# Patient Record
Sex: FEMALE | Race: BLACK OR AFRICAN AMERICAN | Employment: OTHER | URBAN - METROPOLITAN AREA
[De-identification: names, ages, dates, MRNs, and addresses within clinical notes are randomized per-mention and may not be internally consistent; named-entity substitution may affect disease eponyms.]

---

## 2024-05-01 ENCOUNTER — HOSPITAL ENCOUNTER (INPATIENT)
Age: 89
LOS: 9 days | Discharge: HOME | End: 2024-05-10
Attending: STUDENT IN AN ORGANIZED HEALTH CARE EDUCATION/TRAINING PROGRAM
Payer: MEDICARE

## 2024-05-01 ENCOUNTER — APPOINTMENT (OUTPATIENT)
Dept: CT IMAGING | Age: 89
End: 2024-05-01
Payer: MEDICARE

## 2024-05-01 DIAGNOSIS — G82.20 PARAPLEGIA (HCC): ICD-10-CM

## 2024-05-01 DIAGNOSIS — L89.153 DECUBITUS ULCER OF SACRAL REGION, STAGE 3 (HCC): ICD-10-CM

## 2024-05-01 DIAGNOSIS — R00.0 TACHYCARDIA: ICD-10-CM

## 2024-05-01 DIAGNOSIS — D72.829 LEUKOCYTOSIS, UNSPECIFIED TYPE: Primary | ICD-10-CM

## 2024-05-01 DIAGNOSIS — R79.89 ELEVATED BRAIN NATRIURETIC PEPTIDE (BNP) LEVEL: ICD-10-CM

## 2024-05-01 PROBLEM — A41.9 SEPSIS (HCC): Status: ACTIVE | Noted: 2024-05-01

## 2024-05-01 LAB
ALBUMIN SERPL-MCNC: 2.1 G/DL (ref 3.2–4.6)
ALBUMIN/GLOB SERPL: 0.5 (ref 1–1.9)
ALP SERPL-CCNC: 171 U/L (ref 35–104)
ALT SERPL-CCNC: 6 U/L (ref 12–65)
ANION GAP SERPL CALC-SCNC: 9 MMOL/L (ref 9–18)
APPEARANCE UR: CLEAR
AST SERPL-CCNC: 30 U/L (ref 15–37)
BACTERIA URNS QL MICRO: 0 /HPF
BASOPHILS # BLD: 0.1 K/UL (ref 0–0.2)
BASOPHILS NFR BLD: 0 % (ref 0–2)
BILIRUB SERPL-MCNC: 0.5 MG/DL (ref 0–1.2)
BILIRUB UR QL: NEGATIVE
BUN SERPL-MCNC: 34 MG/DL (ref 8–23)
CALCIUM SERPL-MCNC: 11.5 MG/DL (ref 8.8–10.2)
CASTS URNS QL MICRO: NORMAL /LPF
CHLORIDE SERPL-SCNC: 104 MMOL/L (ref 98–107)
CO2 SERPL-SCNC: 24 MMOL/L (ref 20–28)
COLOR UR: NORMAL
CREAT SERPL-MCNC: 1.05 MG/DL (ref 0.6–1.1)
CRYSTALS URNS QL MICRO: 0 /LPF
DIFFERENTIAL METHOD BLD: ABNORMAL
EOSINOPHIL # BLD: 0 K/UL (ref 0–0.8)
EOSINOPHIL NFR BLD: 0 % (ref 0.5–7.8)
EPI CELLS #/AREA URNS HPF: NORMAL /HPF
ERYTHROCYTE [DISTWIDTH] IN BLOOD BY AUTOMATED COUNT: 16.6 % (ref 11.9–14.6)
GLOBULIN SER CALC-MCNC: 4.2 G/DL (ref 2.3–3.5)
GLUCOSE SERPL-MCNC: 154 MG/DL (ref 70–99)
GLUCOSE UR STRIP.AUTO-MCNC: NEGATIVE MG/DL
HCT VFR BLD AUTO: 24.8 % (ref 35.8–46.3)
HGB BLD-MCNC: 7.9 G/DL (ref 11.7–15.4)
HGB UR QL STRIP: NEGATIVE
IMM GRANULOCYTES # BLD AUTO: 0.2 K/UL (ref 0–0.5)
IMM GRANULOCYTES NFR BLD AUTO: 1 % (ref 0–5)
KETONES UR QL STRIP.AUTO: NEGATIVE MG/DL
LACTATE SERPL-SCNC: 1.7 MMOL/L (ref 0.5–2)
LACTATE SERPL-SCNC: 2.1 MMOL/L (ref 0.5–2)
LEUKOCYTE ESTERASE UR QL STRIP.AUTO: NEGATIVE
LIPASE SERPL-CCNC: 15 U/L (ref 13–60)
LYMPHOCYTES # BLD: 2.2 K/UL (ref 0.5–4.6)
LYMPHOCYTES NFR BLD: 15 % (ref 13–44)
MCH RBC QN AUTO: 27.4 PG (ref 26.1–32.9)
MCHC RBC AUTO-ENTMCNC: 31.9 G/DL (ref 31.4–35)
MCV RBC AUTO: 86.1 FL (ref 82–102)
MONOCYTES # BLD: 1.6 K/UL (ref 0.1–1.3)
MONOCYTES NFR BLD: 11 % (ref 4–12)
MUCOUS THREADS URNS QL MICRO: 0 /LPF
NEUTS SEG # BLD: 11 K/UL (ref 1.7–8.2)
NEUTS SEG NFR BLD: 73 % (ref 43–78)
NITRITE UR QL STRIP.AUTO: NEGATIVE
NRBC # BLD: 0 K/UL (ref 0–0.2)
NT PRO BNP: 1002 PG/ML (ref 0–450)
OTHER OBSERVATIONS: NORMAL
PH UR STRIP: 5.5 (ref 5–9)
PLATELET # BLD AUTO: 401 K/UL (ref 150–450)
PMV BLD AUTO: 9.4 FL (ref 9.4–12.3)
POTASSIUM SERPL-SCNC: 4.9 MMOL/L (ref 3.5–5.1)
PROCALCITONIN SERPL-MCNC: 0.57 NG/ML (ref 0–0.1)
PROT SERPL-MCNC: 6.3 G/DL (ref 6.3–8.2)
PROT UR STRIP-MCNC: NEGATIVE MG/DL
RBC # BLD AUTO: 2.88 M/UL (ref 4.05–5.2)
RBC #/AREA URNS HPF: NORMAL /HPF
SODIUM SERPL-SCNC: 136 MMOL/L (ref 136–145)
SP GR UR REFRACTOMETRY: 1.01 (ref 1–1.02)
URINE CULTURE IF INDICATED: NORMAL
UROBILINOGEN UR QL STRIP.AUTO: 0.2 EU/DL (ref 0.2–1)
WBC # BLD AUTO: 15 K/UL (ref 4.3–11.1)
WBC URNS QL MICRO: NORMAL /HPF

## 2024-05-01 PROCEDURE — 87154 CUL TYP ID BLD PTHGN 6+ TRGT: CPT

## 2024-05-01 PROCEDURE — 99285 EMERGENCY DEPT VISIT HI MDM: CPT

## 2024-05-01 PROCEDURE — 87040 BLOOD CULTURE FOR BACTERIA: CPT

## 2024-05-01 PROCEDURE — 81001 URINALYSIS AUTO W/SCOPE: CPT

## 2024-05-01 PROCEDURE — 36415 COLL VENOUS BLD VENIPUNCTURE: CPT

## 2024-05-01 PROCEDURE — 80053 COMPREHEN METABOLIC PANEL: CPT

## 2024-05-01 PROCEDURE — 93005 ELECTROCARDIOGRAM TRACING: CPT | Performed by: EMERGENCY MEDICINE

## 2024-05-01 PROCEDURE — 96375 TX/PRO/DX INJ NEW DRUG ADDON: CPT

## 2024-05-01 PROCEDURE — 6360000002 HC RX W HCPCS: Performed by: STUDENT IN AN ORGANIZED HEALTH CARE EDUCATION/TRAINING PROGRAM

## 2024-05-01 PROCEDURE — 84145 PROCALCITONIN (PCT): CPT

## 2024-05-01 PROCEDURE — 87205 SMEAR GRAM STAIN: CPT

## 2024-05-01 PROCEDURE — 96374 THER/PROPH/DIAG INJ IV PUSH: CPT

## 2024-05-01 PROCEDURE — 85025 COMPLETE CBC W/AUTO DIFF WBC: CPT

## 2024-05-01 PROCEDURE — 2580000003 HC RX 258: Performed by: STUDENT IN AN ORGANIZED HEALTH CARE EDUCATION/TRAINING PROGRAM

## 2024-05-01 PROCEDURE — 6360000004 HC RX CONTRAST MEDICATION: Performed by: STUDENT IN AN ORGANIZED HEALTH CARE EDUCATION/TRAINING PROGRAM

## 2024-05-01 PROCEDURE — 74177 CT ABD & PELVIS W/CONTRAST: CPT

## 2024-05-01 PROCEDURE — 87186 SC STD MICRODIL/AGAR DIL: CPT

## 2024-05-01 PROCEDURE — 83880 ASSAY OF NATRIURETIC PEPTIDE: CPT

## 2024-05-01 PROCEDURE — 83605 ASSAY OF LACTIC ACID: CPT

## 2024-05-01 PROCEDURE — 6370000000 HC RX 637 (ALT 250 FOR IP): Performed by: STUDENT IN AN ORGANIZED HEALTH CARE EDUCATION/TRAINING PROGRAM

## 2024-05-01 PROCEDURE — 1100000000 HC RM PRIVATE

## 2024-05-01 PROCEDURE — 83690 ASSAY OF LIPASE: CPT

## 2024-05-01 PROCEDURE — 87077 CULTURE AEROBIC IDENTIFY: CPT

## 2024-05-01 RX ORDER — GINSENG 100 MG
1 CAPSULE ORAL DAILY
Status: ON HOLD | COMMUNITY
End: 2024-05-10 | Stop reason: HOSPADM

## 2024-05-01 RX ORDER — SODIUM CHLORIDE 0.9 % (FLUSH) 0.9 %
5-40 SYRINGE (ML) INJECTION PRN
Status: DISCONTINUED | OUTPATIENT
Start: 2024-05-01 | End: 2024-05-10 | Stop reason: HOSPADM

## 2024-05-01 RX ORDER — ACETAMINOPHEN 325 MG/1
650 TABLET ORAL EVERY 6 HOURS PRN
Status: DISCONTINUED | OUTPATIENT
Start: 2024-05-01 | End: 2024-05-10 | Stop reason: HOSPADM

## 2024-05-01 RX ORDER — POLYETHYLENE GLYCOL 3350 17 G/17G
17 POWDER, FOR SOLUTION ORAL DAILY PRN
Status: DISCONTINUED | OUTPATIENT
Start: 2024-05-01 | End: 2024-05-10 | Stop reason: HOSPADM

## 2024-05-01 RX ORDER — SODIUM CHLORIDE 0.9 % (FLUSH) 0.9 %
5-40 SYRINGE (ML) INJECTION EVERY 12 HOURS SCHEDULED
Status: DISCONTINUED | OUTPATIENT
Start: 2024-05-01 | End: 2024-05-10 | Stop reason: HOSPADM

## 2024-05-01 RX ORDER — ISOSORBIDE DINITRATE 20 MG/1
20 TABLET ORAL 3 TIMES DAILY
Status: ON HOLD | COMMUNITY
End: 2024-05-10 | Stop reason: HOSPADM

## 2024-05-01 RX ORDER — ENOXAPARIN SODIUM 100 MG/ML
40 INJECTION SUBCUTANEOUS DAILY
Status: DISCONTINUED | OUTPATIENT
Start: 2024-05-02 | End: 2024-05-07

## 2024-05-01 RX ORDER — ACETAMINOPHEN 325 MG/1
325 TABLET ORAL EVERY 6 HOURS PRN
COMMUNITY

## 2024-05-01 RX ORDER — SODIUM CHLORIDE 9 MG/ML
INJECTION, SOLUTION INTRAVENOUS PRN
Status: DISCONTINUED | OUTPATIENT
Start: 2024-05-01 | End: 2024-05-10 | Stop reason: HOSPADM

## 2024-05-01 RX ORDER — LEVOTHYROXINE SODIUM 0.05 MG/1
50 TABLET ORAL DAILY
COMMUNITY

## 2024-05-01 RX ORDER — ONDANSETRON 2 MG/ML
4 INJECTION INTRAMUSCULAR; INTRAVENOUS EVERY 6 HOURS PRN
Status: DISCONTINUED | OUTPATIENT
Start: 2024-05-01 | End: 2024-05-10 | Stop reason: HOSPADM

## 2024-05-01 RX ORDER — FUROSEMIDE 10 MG/ML
20 INJECTION INTRAMUSCULAR; INTRAVENOUS ONCE
Status: COMPLETED | OUTPATIENT
Start: 2024-05-01 | End: 2024-05-01

## 2024-05-01 RX ORDER — LOSARTAN POTASSIUM 50 MG/1
50 TABLET ORAL 2 TIMES DAILY
COMMUNITY

## 2024-05-01 RX ORDER — ONDANSETRON 4 MG/1
4 TABLET, ORALLY DISINTEGRATING ORAL EVERY 8 HOURS PRN
Status: DISCONTINUED | OUTPATIENT
Start: 2024-05-01 | End: 2024-05-10 | Stop reason: HOSPADM

## 2024-05-01 RX ORDER — OMEPRAZOLE 20 MG/1
40 CAPSULE, DELAYED RELEASE ORAL DAILY
COMMUNITY

## 2024-05-01 RX ORDER — ACETAMINOPHEN 650 MG/1
650 SUPPOSITORY RECTAL EVERY 6 HOURS PRN
Status: DISCONTINUED | OUTPATIENT
Start: 2024-05-01 | End: 2024-05-10 | Stop reason: HOSPADM

## 2024-05-01 RX ORDER — ONDANSETRON 4 MG/1
4 TABLET, ORALLY DISINTEGRATING ORAL EVERY 6 HOURS PRN
Status: ON HOLD | COMMUNITY
End: 2024-05-10 | Stop reason: HOSPADM

## 2024-05-01 RX ORDER — METOPROLOL SUCCINATE 50 MG/1
50 TABLET, EXTENDED RELEASE ORAL DAILY
COMMUNITY

## 2024-05-01 RX ORDER — AMLODIPINE BESYLATE 5 MG/1
5 TABLET ORAL DAILY
Status: ON HOLD | COMMUNITY
End: 2024-05-10 | Stop reason: HOSPADM

## 2024-05-01 RX ADMIN — IOPAMIDOL 100 ML: 755 INJECTION, SOLUTION INTRAVENOUS at 13:50

## 2024-05-01 RX ADMIN — ACETAMINOPHEN 650 MG: 325 TABLET ORAL at 22:12

## 2024-05-01 RX ADMIN — SODIUM CHLORIDE, PRESERVATIVE FREE 10 ML: 5 INJECTION INTRAVENOUS at 22:08

## 2024-05-01 RX ADMIN — VANCOMYCIN HYDROCHLORIDE 750 MG: 750 INJECTION, POWDER, LYOPHILIZED, FOR SOLUTION INTRAVENOUS at 22:08

## 2024-05-01 RX ADMIN — SODIUM CHLORIDE: 9 INJECTION, SOLUTION INTRAVENOUS at 22:05

## 2024-05-01 RX ADMIN — FUROSEMIDE 20 MG: 10 INJECTION, SOLUTION INTRAMUSCULAR; INTRAVENOUS at 17:24

## 2024-05-01 RX ADMIN — WATER 1000 MG: 1 INJECTION INTRAMUSCULAR; INTRAVENOUS; SUBCUTANEOUS at 16:32

## 2024-05-01 ASSESSMENT — PAIN SCALES - GENERAL
PAINLEVEL_OUTOF10: 3
PAINLEVEL_OUTOF10: 5
PAINLEVEL_OUTOF10: 0
PAINLEVEL_OUTOF10: 0

## 2024-05-01 ASSESSMENT — PAIN DESCRIPTION - DESCRIPTORS: DESCRIPTORS: ACHING;CRAMPING

## 2024-05-01 ASSESSMENT — LIFESTYLE VARIABLES
HOW OFTEN DO YOU HAVE A DRINK CONTAINING ALCOHOL: NEVER
HOW MANY STANDARD DRINKS CONTAINING ALCOHOL DO YOU HAVE ON A TYPICAL DAY: PATIENT DOES NOT DRINK

## 2024-05-01 ASSESSMENT — PAIN DESCRIPTION - ORIENTATION: ORIENTATION: LEFT

## 2024-05-01 ASSESSMENT — PAIN - FUNCTIONAL ASSESSMENT
PAIN_FUNCTIONAL_ASSESSMENT: PREVENTS OR INTERFERES SOME ACTIVE ACTIVITIES AND ADLS
PAIN_FUNCTIONAL_ASSESSMENT: 0-10

## 2024-05-01 ASSESSMENT — PAIN DESCRIPTION - PAIN TYPE: TYPE: ACUTE PAIN

## 2024-05-01 ASSESSMENT — PAIN DESCRIPTION - LOCATION
LOCATION: ARM;BREAST
LOCATION: ABDOMEN;BACK

## 2024-05-01 NOTE — ED PROVIDER NOTES
Emergency Department Provider Note       PCP: Unknown, Provider, APRN - NP   Age: 89 y.o.   Sex: female     DISPOSITION Admitted 05/01/2024 06:42:51 PM       ICD-10-CM    1. Leukocytosis, unspecified type  D72.829       2. Elevated brain natriuretic peptide (BNP) level  R79.89       3. Tachycardia  R00.0           Medical Decision Making     89-year-old female presents to Emergency department with family.  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  Patient reports fullness in her abdomen.  Family reports she takes Lasix intermittently given by the staff at the facility when she needs it for fluid retention.  He is no longer doing chemotherapy as it apparently affected patient's heart function.  Is on blood thinners.  Has nausea but no vomiting.  Patient is not aware when she has bowel movements or urinates but then reports staff did not have any concern for patient being constipated.  Apparently patient has a large sacral wound which they are also concerned about.  Will obtain basic lab work.  Lab work shows white count 15, hemoglobin 7.9, BNP elevated at 1000, patient was given Lasix.  CMP does show significantly elevated BUN to creatinine ratio.  Pro-Chris was elevated at 0.57 of unclear etiology.  Blood cultures and lactic were obtained lactic is normal.  CT scan shows no emergent findings.  Given patient's white count, elevated Pro-Chris, as well as elevated BNP, patient would benefit from medical admission.  Hospitalist consulted.  Patient and family voiced understanding and agreement.     1 acute illness with systemic symptoms.      I independently ordered and reviewed each unique test.     The patients assessment required an independent historian: family.  The reason they were needed is important historical information not provided by the patient.  I interpreted the CT Scan no

## 2024-05-01 NOTE — ED TRIAGE NOTES
Arrived via EMS, coming from [facility], C/o Abd swelling with pain x1 month radiating into chest started today. Denies B&B concerns. Thinks her Lasix was stopped when she went to [facility].     H/o Left breast Cancer with Mets to spine

## 2024-05-01 NOTE — PROGRESS NOTES
VANCO DAILY FOLLOW UP NOTE  [facility]   Pharmacy Pharmacokinetic Monitoring Service - Vancomycin    Consulting Provider: Dr. Lopez   Indication: SSTI  Target Concentration: Goal AUC/JAMILA 400-600 mg*hr/L  Day of Therapy: 1 of 7  Additional Antimicrobials: cefepime    Pertinent Laboratory Values:   Wt Readings from Last 1 Encounters:   05/01/24 72.6 kg (160 lb)     Temp Readings from Last 1 Encounters:   05/01/24 97.9 °F (36.6 °C) (Oral)     Recent Labs     05/01/24  1303 05/01/24  1435   BUN 34*  --    CREATININE 1.05  --    WBC 15.0*  --    PROCAL 0.57*  --    LACTSEPSIS  --  1.7     Estimated Creatinine Clearance: 36 mL/min (based on SCr of 1.05 mg/dL).    No results found for: \"VANCOTROUGH\", \"VANCORANDOM\"    MRSA Nasal Swab: N/A. Non-respiratory infection    Assessment:  Date/Time Dose Concentration AUC         Note: Serum concentrations collected for AUC dosing may appear elevated if collected in close proximity to the dose administered, this is not necessarily an indication of toxicity    Plan:  Dosing recommendations based on Bayesian software  Start vancomycin 1500 mg x 1 followed by 750 mg every 24 hours  Anticipated AUC of 441 and trough concentration of 13.3 at steady state  Renal labs as indicated   Vancomycin concentrations will be ordered as clinically appropriate   Pharmacy will continue to monitor patient and adjust therapy as indicated    Thank you for the consult,  Brittni Stout RPH

## 2024-05-01 NOTE — H&P
Hospitalist History and Physical   Admit Date:  2024 11:49 AM   Name:  Brittany Chapin   Age:  89 y.o.  Sex:  female  :  1933   MRN:  [id number]   Room:  [facility]/    Presenting/Chief Complaint: Abdominal pain  Reason(s) for Admission: Tachycardia [R00.0]  Elevated brain natriuretic peptide (BNP) level [R79.89]  Sepsis (HCC) [A41.9]  Leukocytosis, unspecified type [D72.829]     History of Present Illness:   Brittany Chapin is a 89 y.o. female with past medical history of breast cancer with metastatic disease to cervical spine who presented to the ED on 2024 with cc of abdominal pain and chest pain for the last several days.  Patient has a history of breast cancer and was noted to have spinal metastatic disease about 3 months ago resulting in paraplegia.  Also noted to have sacral wound over the last month that has continued to progress.    In the ED, labs showed creatinine 1.05, lactic acid 1.7, calcium 11.5, proBNP 1002, WBC 15, and hemoglobin 10.9.  CT showed no acute abnormalities.  She received IV ceftriaxone, IV Lasix, and was admitted for further care.    Assessment & Plan:     Sepsis due to sacral wound (POA, due to suspected gram positive bacteria)   -As evidenced by HR > 90, RR > 20  -Cefepime and vancomycin  -Consult surgery for possible debridement    Breast cancer with metastatic disease to the spine  -Analgesics as needed    Hypertension  -Metoprolol  -Received Lasix in the ED  -Resume home losartan, Imdur pending med rec    Chronic normocytic anemia  -No evidence of active bleeding  -Trend CBC    Hypothyroidism  -Synthroid    Dispo: Anticipate length stay greater than 2 midnights.  PT/OT evals and PPD needed/ordered?  No  Diet: ADULT DIET; Regular  ADULT ORAL NUTRITION SUPPLEMENT; Breakfast, Lunch, Dinner; Standard High Calorie/High Protein Oral Supplement  VTE prophylaxis: Lovenox  Code status: DNR    Non-peripheral Lines and Tubes (if present):     none      Hospital Problems:  Principal

## 2024-05-02 PROBLEM — R33.9 URINARY RETENTION: Status: ACTIVE | Noted: 2024-05-02

## 2024-05-02 LAB
ACCESSION NUMBER, LLC1M: ABNORMAL
ACINETOBACTER CALCOAC BAUMANNII COMPLEX BY PCR: NOT DETECTED
ALBUMIN SERPL-MCNC: 2 G/DL (ref 3.2–4.6)
ALBUMIN/GLOB SERPL: 0.4 (ref 1–1.9)
ALP SERPL-CCNC: 146 U/L (ref 35–104)
ALT SERPL-CCNC: 6 U/L (ref 12–65)
ANION GAP SERPL CALC-SCNC: 15 MMOL/L (ref 9–18)
AST SERPL-CCNC: 26 U/L (ref 15–37)
B FRAGILIS DNA BLD POS QL NAA+NON-PROBE: NOT DETECTED
BASOPHILS # BLD: 0 K/UL (ref 0–0.2)
BASOPHILS NFR BLD: 0 % (ref 0–2)
BILIRUB SERPL-MCNC: 0.5 MG/DL (ref 0–1.2)
BIOFIRE TEST COMMENT: ABNORMAL
BUN SERPL-MCNC: 33 MG/DL (ref 8–23)
C ALBICANS DNA BLD POS QL NAA+NON-PROBE: NOT DETECTED
C AURIS DNA BLD POS QL NAA+NON-PROBE: NOT DETECTED
C GATTII+NEOFOR DNA BLD POS QL NAA+N-PRB: NOT DETECTED
C GLABRATA DNA BLD POS QL NAA+NON-PROBE: NOT DETECTED
C KRUSEI DNA BLD POS QL NAA+NON-PROBE: NOT DETECTED
C PARAP DNA BLD POS QL NAA+NON-PROBE: NOT DETECTED
C TROPICLS DNA BLD POS QL NAA+NON-PROBE: NOT DETECTED
CALCIUM SERPL-MCNC: 11.6 MG/DL (ref 8.8–10.2)
CHLORIDE SERPL-SCNC: 101 MMOL/L (ref 98–107)
CO2 SERPL-SCNC: 22 MMOL/L (ref 20–28)
CREAT SERPL-MCNC: 1.06 MG/DL (ref 0.6–1.1)
DIFFERENTIAL METHOD BLD: ABNORMAL
E CLOAC COMP DNA BLD POS NAA+NON-PROBE: NOT DETECTED
E COLI DNA BLD POS QL NAA+NON-PROBE: NOT DETECTED
E FAECALIS DNA BLD POS QL NAA+NON-PROBE: NOT DETECTED
E FAECIUM DNA BLD POS QL NAA+NON-PROBE: NOT DETECTED
EKG ATRIAL RATE: 91 BPM
EKG DIAGNOSIS: NORMAL
EKG P AXIS: 49 DEGREES
EKG P-R INTERVAL: 176 MS
EKG Q-T INTERVAL: 364 MS
EKG QRS DURATION: 84 MS
EKG QTC CALCULATION (BAZETT): 448 MS
EKG R AXIS: 14 DEGREES
EKG T AXIS: 56 DEGREES
EKG VENTRICULAR RATE: 91 BPM
ENTEROBACTERALES DNA BLD POS NAA+N-PRB: NOT DETECTED
EOSINOPHIL # BLD: 0.1 K/UL (ref 0–0.8)
EOSINOPHIL NFR BLD: 0 % (ref 0.5–7.8)
ERYTHROCYTE [DISTWIDTH] IN BLOOD BY AUTOMATED COUNT: 16.8 % (ref 11.9–14.6)
GLOBULIN SER CALC-MCNC: 4.5 G/DL (ref 2.3–3.5)
GLUCOSE SERPL-MCNC: 110 MG/DL (ref 70–99)
GP B STREP DNA BLD POS QL NAA+NON-PROBE: NOT DETECTED
HAEM INFLU DNA BLD POS QL NAA+NON-PROBE: NOT DETECTED
HCT VFR BLD AUTO: 23.1 % (ref 35.8–46.3)
HGB BLD-MCNC: 7.1 G/DL (ref 11.7–15.4)
IMM GRANULOCYTES # BLD AUTO: 0.1 K/UL (ref 0–0.5)
IMM GRANULOCYTES NFR BLD AUTO: 1 % (ref 0–5)
K OXYTOCA DNA BLD POS QL NAA+NON-PROBE: NOT DETECTED
KLEBSIELLA SP DNA BLD POS QL NAA+NON-PRB: NOT DETECTED
KLEBSIELLA SP DNA BLD POS QL NAA+NON-PRB: NOT DETECTED
L MONOCYTOG DNA BLD POS QL NAA+NON-PROBE: NOT DETECTED
LYMPHOCYTES # BLD: 2.7 K/UL (ref 0.5–4.6)
LYMPHOCYTES NFR BLD: 16 % (ref 13–44)
MAGNESIUM SERPL-MCNC: 2 MG/DL (ref 1.8–2.4)
MCH RBC QN AUTO: 27 PG (ref 26.1–32.9)
MCHC RBC AUTO-ENTMCNC: 30.7 G/DL (ref 31.4–35)
MCV RBC AUTO: 87.8 FL (ref 82–102)
MECA+MECC ISLT/SPM QL: DETECTED
MONOCYTES # BLD: 2.4 K/UL (ref 0.1–1.3)
MONOCYTES NFR BLD: 14 % (ref 4–12)
N MEN DNA BLD POS QL NAA+NON-PROBE: NOT DETECTED
NEUTS SEG # BLD: 11.4 K/UL (ref 1.7–8.2)
NEUTS SEG NFR BLD: 69 % (ref 43–78)
NRBC # BLD: 0.02 K/UL (ref 0–0.2)
P AERUGINOSA DNA BLD POS NAA+NON-PROBE: NOT DETECTED
PLATELET # BLD AUTO: 380 K/UL (ref 150–450)
PMV BLD AUTO: 9.7 FL (ref 9.4–12.3)
POTASSIUM SERPL-SCNC: 4.4 MMOL/L (ref 3.5–5.1)
PROT SERPL-MCNC: 6.5 G/DL (ref 6.3–8.2)
PROTEUS SP DNA BLD POS QL NAA+NON-PROBE: NOT DETECTED
RBC # BLD AUTO: 2.63 M/UL (ref 4.05–5.2)
RESISTANT GENE TARGETS: ABNORMAL
S AUREUS DNA BLD POS QL NAA+NON-PROBE: NOT DETECTED
S AUREUS+CONS DNA BLD POS NAA+NON-PROBE: DETECTED
S EPIDERMIDIS DNA BLD POS QL NAA+NON-PRB: DETECTED
S LUGDUNENSIS DNA BLD POS QL NAA+NON-PRB: NOT DETECTED
S MALTOPHILIA DNA BLD POS QL NAA+NON-PRB: NOT DETECTED
S MARCESCENS DNA BLD POS NAA+NON-PROBE: NOT DETECTED
S PNEUM DNA BLD POS QL NAA+NON-PROBE: NOT DETECTED
S PYO DNA BLD POS QL NAA+NON-PROBE: NOT DETECTED
SALMONELLA DNA BLD POS QL NAA+NON-PROBE: NOT DETECTED
SODIUM SERPL-SCNC: 138 MMOL/L (ref 136–145)
STREPTOCOCCUS DNA BLD POS NAA+NON-PROBE: NOT DETECTED
WBC # BLD AUTO: 16.7 K/UL (ref 4.3–11.1)

## 2024-05-02 PROCEDURE — 36415 COLL VENOUS BLD VENIPUNCTURE: CPT

## 2024-05-02 PROCEDURE — 6360000002 HC RX W HCPCS: Performed by: STUDENT IN AN ORGANIZED HEALTH CARE EDUCATION/TRAINING PROGRAM

## 2024-05-02 PROCEDURE — 97530 THERAPEUTIC ACTIVITIES: CPT

## 2024-05-02 PROCEDURE — 2580000003 HC RX 258: Performed by: STUDENT IN AN ORGANIZED HEALTH CARE EDUCATION/TRAINING PROGRAM

## 2024-05-02 PROCEDURE — 6370000000 HC RX 637 (ALT 250 FOR IP): Performed by: STUDENT IN AN ORGANIZED HEALTH CARE EDUCATION/TRAINING PROGRAM

## 2024-05-02 PROCEDURE — 2400000000

## 2024-05-02 PROCEDURE — 97112 NEUROMUSCULAR REEDUCATION: CPT

## 2024-05-02 PROCEDURE — 97161 PT EVAL LOW COMPLEX 20 MIN: CPT

## 2024-05-02 PROCEDURE — 1100000000 HC RM PRIVATE

## 2024-05-02 PROCEDURE — 85025 COMPLETE CBC W/AUTO DIFF WBC: CPT

## 2024-05-02 PROCEDURE — 93010 ELECTROCARDIOGRAM REPORT: CPT | Performed by: INTERNAL MEDICINE

## 2024-05-02 PROCEDURE — 83735 ASSAY OF MAGNESIUM: CPT

## 2024-05-02 PROCEDURE — 80053 COMPREHEN METABOLIC PANEL: CPT

## 2024-05-02 PROCEDURE — 2500000003 HC RX 250 WO HCPCS: Performed by: STUDENT IN AN ORGANIZED HEALTH CARE EDUCATION/TRAINING PROGRAM

## 2024-05-02 PROCEDURE — 97167 OT EVAL HIGH COMPLEX 60 MIN: CPT

## 2024-05-02 RX ORDER — MORPHINE SULFATE 2 MG/ML
2 INJECTION, SOLUTION INTRAMUSCULAR; INTRAVENOUS EVERY 4 HOURS PRN
Status: DISCONTINUED | OUTPATIENT
Start: 2024-05-02 | End: 2024-05-10 | Stop reason: HOSPADM

## 2024-05-02 RX ORDER — METOPROLOL SUCCINATE 50 MG/1
50 TABLET, EXTENDED RELEASE ORAL DAILY
Status: DISCONTINUED | OUTPATIENT
Start: 2024-05-02 | End: 2024-05-10 | Stop reason: HOSPADM

## 2024-05-02 RX ORDER — SODIUM CHLORIDE, SODIUM LACTATE, POTASSIUM CHLORIDE, CALCIUM CHLORIDE 600; 310; 30; 20 MG/100ML; MG/100ML; MG/100ML; MG/100ML
INJECTION, SOLUTION INTRAVENOUS CONTINUOUS
Status: DISCONTINUED | OUTPATIENT
Start: 2024-05-02 | End: 2024-05-05

## 2024-05-02 RX ORDER — SODIUM HYPOCHLORITE 1.25 MG/ML
SOLUTION TOPICAL DAILY
Status: DISCONTINUED | OUTPATIENT
Start: 2024-05-03 | End: 2024-05-10 | Stop reason: HOSPADM

## 2024-05-02 RX ORDER — TAMSULOSIN HYDROCHLORIDE 0.4 MG/1
0.4 CAPSULE ORAL DAILY
Status: DISCONTINUED | OUTPATIENT
Start: 2024-05-02 | End: 2024-05-02

## 2024-05-02 RX ORDER — HYDROCODONE BITARTRATE AND ACETAMINOPHEN 5; 325 MG/1; MG/1
1 TABLET ORAL EVERY 6 HOURS PRN
Status: DISCONTINUED | OUTPATIENT
Start: 2024-05-02 | End: 2024-05-10 | Stop reason: HOSPADM

## 2024-05-02 RX ORDER — LEVOTHYROXINE SODIUM 0.05 MG/1
50 TABLET ORAL DAILY
Status: DISCONTINUED | OUTPATIENT
Start: 2024-05-02 | End: 2024-05-10 | Stop reason: HOSPADM

## 2024-05-02 RX ADMIN — ACETAMINOPHEN 650 MG: 325 TABLET ORAL at 05:25

## 2024-05-02 RX ADMIN — WATER 2000 MG: 1 INJECTION INTRAMUSCULAR; INTRAVENOUS; SUBCUTANEOUS at 14:00

## 2024-05-02 RX ADMIN — SODIUM CHLORIDE, PRESERVATIVE FREE 10 ML: 5 INJECTION INTRAVENOUS at 20:37

## 2024-05-02 RX ADMIN — SODIUM CHLORIDE, POTASSIUM CHLORIDE, SODIUM LACTATE AND CALCIUM CHLORIDE: 600; 310; 30; 20 INJECTION, SOLUTION INTRAVENOUS at 09:25

## 2024-05-02 RX ADMIN — ACETAMINOPHEN 650 MG: 325 TABLET ORAL at 20:35

## 2024-05-02 RX ADMIN — ONDANSETRON 4 MG: 4 TABLET, ORALLY DISINTEGRATING ORAL at 18:07

## 2024-05-02 RX ADMIN — TUBERCULIN PURIFIED PROTEIN DERIVATIVE 5 UNITS: 5 INJECTION, SOLUTION INTRADERMAL at 09:12

## 2024-05-02 RX ADMIN — SODIUM CHLORIDE, PRESERVATIVE FREE 10 ML: 5 INJECTION INTRAVENOUS at 09:14

## 2024-05-02 RX ADMIN — ACETAMINOPHEN 650 MG: 325 TABLET ORAL at 09:20

## 2024-05-02 RX ADMIN — CEFEPIME 2000 MG: 2 INJECTION, POWDER, FOR SOLUTION INTRAVENOUS at 22:44

## 2024-05-02 RX ADMIN — VANCOMYCIN HYDROCHLORIDE 1000 MG: 1 INJECTION, POWDER, LYOPHILIZED, FOR SOLUTION INTRAVENOUS at 20:35

## 2024-05-02 RX ADMIN — ONDANSETRON 4 MG: 4 TABLET, ORALLY DISINTEGRATING ORAL at 18:06

## 2024-05-02 RX ADMIN — METOPROLOL SUCCINATE 50 MG: 50 TABLET, EXTENDED RELEASE ORAL at 09:12

## 2024-05-02 RX ADMIN — ENOXAPARIN SODIUM 40 MG: 100 INJECTION SUBCUTANEOUS at 09:12

## 2024-05-02 RX ADMIN — LEVOTHYROXINE SODIUM 50 MCG: 0.05 TABLET ORAL at 05:25

## 2024-05-02 ASSESSMENT — PAIN DESCRIPTION - DESCRIPTORS
DESCRIPTORS: SORE
DESCRIPTORS: NAGGING;SPASM
DESCRIPTORS: ACHING
DESCRIPTORS: PRESSURE;SORE

## 2024-05-02 ASSESSMENT — PAIN DESCRIPTION - ORIENTATION
ORIENTATION: LEFT
ORIENTATION: RIGHT;LEFT

## 2024-05-02 ASSESSMENT — PAIN SCALES - GENERAL
PAINLEVEL_OUTOF10: 1
PAINLEVEL_OUTOF10: 4
PAINLEVEL_OUTOF10: 3
PAINLEVEL_OUTOF10: 3
PAINLEVEL_OUTOF10: 6
PAINLEVEL_OUTOF10: 5
PAINLEVEL_OUTOF10: 0
PAINLEVEL_OUTOF10: 3

## 2024-05-02 ASSESSMENT — PAIN DESCRIPTION - LOCATION
LOCATION: SHOULDER
LOCATION: ARM
LOCATION: BACK
LOCATION: NECK
LOCATION: NECK

## 2024-05-02 ASSESSMENT — PAIN SCALES - WONG BAKER
WONGBAKER_NUMERICALRESPONSE: NO HURT
WONGBAKER_NUMERICALRESPONSE: NO HURT

## 2024-05-02 NOTE — CONSULTS
H&P/Consult Note/Progress Note/Office Note:   Brittany Chapin  MRN: [id number]  :1933  Age:89 y.o.    General Surgery Consult ordered by: Dr Lopez  Reason for General Surgery Consult: Eval Sacral wound for surgical debridement    HPI: Brittany Chapin is a 89 y.o. female with a past medical history of breast cancer with metastatic disease to cervical spine resulting in paraplegia approximately 3 months ago, HTN, Chronic normocytic anemia, hypothyroidism, and sacral wound who presented to the ED 24 via EMS from [facility] with C/o Abdominal pain and swelling for the past month radiating to her chest that began today.     Pt was admitted by hospitalist due to Sepsis. General surgery was asked to evaluate pts sacral wound for debridement.     Labs in ED: Lactic acid 1.7, proBNP 1002, WBC 15.    5 CT Abdomen & Pelvis  IMPRESSION:     No acute abdominal or pelvic findings. Gallbladder is absent. No biliary ductal dilatation.     Benign bilateral small renal cysts. No suspicious solid renal mass. No  hydronephrosis. No obstructive uropathy or urinary tract stones.     No mass, adenopathy, or abnormal fluid collections.     No free fluid or inflammatory changes. No mechanical bowel obstruction.     Previous hysterectomy. Symmetry pelvic fat and fascial planes. No free fluid. No  free air. No pelvic lymphadenopathy or inguinal hernia.    Past Surgical History: Hysterectomy    Pt currently taking blood thinners: Eliquis 5mg BID         No past medical history on file.  No past surgical history on file.  Current Facility-Administered Medications   Medication Dose Route Frequency    levothyroxine (SYNTHROID) tablet 50 mcg  50 mcg Oral Daily    metoprolol succinate (TOPROL XL) extended release tablet 50 mg  50 mg Oral Daily    HYDROcodone-acetaminophen (NORCO) 5-325 MG per tablet 1 tablet  1 tablet Oral Q6H PRN    morphine injection 2 mg  2 mg IntraVENous Q4H PRN    tuberculin

## 2024-05-02 NOTE — PLAN OF CARE
Problem: Discharge Planning  Goal: Discharge to home or other facility with appropriate resources  Outcome: Progressing     Problem: Pain  Goal: Verbalizes/displays adequate comfort level or baseline comfort level  Outcome: Progressing     Problem: Skin/Tissue Integrity  Goal: Absence of new skin breakdown  Description: 1.  Monitor for areas of redness and/or skin breakdown  2.  Assess vascular access sites hourly  3.  Every 4-6 hours minimum:  Change oxygen saturation probe site  4.  Every 4-6 hours:  If on nasal continuous positive airway pressure, respiratory therapy assess nares and determine need for appliance change or resting period.  Outcome: Progressing     Problem: Safety - Adult  Goal: Free from fall injury  Outcome: Progressing     Problem: ABCDS Injury Assessment  Goal: Absence of physical injury  Outcome: Progressing      pt left prior to being seen by MD or PA. EKG from triage was found and reviewed and documented. Spoke to RN and she states she never saw patient either.

## 2024-05-02 NOTE — ED NOTES
TRANSFER - OUT REPORT:    Verbal report given to Heidy Chapin  being transferred to [facility] for routine progression of patient care       Report consisted of patient's Situation, Background, Assessment and   Recommendations(SBAR).     Information from the following report(s) ED SBAR was reviewed with the receiving nurse.    [place] Fall Assessment:    Presents to emergency department  because of falls (Syncope, seizure, or loss of consciousness): No  Age > 70: Yes  Altered Mental Status, Intoxication with alcohol or substance confusion (Disorientation, impaired judgment, poor safety awaremess, or inability to follow instructions): No  Impaired Mobility: Ambulates or transfers with assistive devices or assistance; Unable to ambulate or transer.: No  Nursing Judgement: No          Lines:   Peripheral IV 05/01/24 Left Antecubital (Active)       Peripheral IV 05/01/24 Right Antecubital (Active)        Opportunity for questions and clarification was provided.      Patient transported with:  Ninfa Joiner RN  05/01/24 2028

## 2024-05-02 NOTE — PLAN OF CARE
Problem: Discharge Planning  Goal: Discharge to home or other facility with appropriate resources  5/2/2024 1926 by Heidy Fuentes RN  Outcome: Progressing  Flowsheets (Taken 5/2/2024 1921)  Discharge to home or other facility with appropriate resources:   Identify barriers to discharge with patient and caregiver   Arrange for needed discharge resources and transportation as appropriate  5/2/2024 1729 by Antonia Mary RN  Outcome: Progressing     Problem: Pain  Goal: Verbalizes/displays adequate comfort level or baseline comfort level  5/2/2024 1926 by Heidy Fuentes RN  Outcome: Progressing  Flowsheets (Taken 5/2/2024 1921)  Verbalizes/displays adequate comfort level or baseline comfort level:   Encourage patient to monitor pain and request assistance   Assess pain using appropriate pain scale  5/2/2024 1729 by Antonia Mary RN  Outcome: Progressing     Problem: Skin/Tissue Integrity  Goal: Absence of new skin breakdown  Description: 1.  Monitor for areas of redness and/or skin breakdown  2.  Assess vascular access sites hourly  3.  Every 4-6 hours minimum:  Change oxygen saturation probe site  4.  Every 4-6 hours:  If on nasal continuous positive airway pressure, respiratory therapy assess nares and determine need for appliance change or resting period.  5/2/2024 1926 by Heidy Fuentes RN  Outcome: Progressing  5/2/2024 1729 by Antonia Mary RN  Outcome: Progressing     Problem: Safety - Adult  Goal: Free from fall injury  5/2/2024 1926 by Heidy Fuentes RN  Outcome: Progressing  5/2/2024 1729 by Antonia Mary RN  Outcome: Progressing     Problem: ABCDS Injury Assessment  Goal: Absence of physical injury  5/2/2024 1926 by Heidy Fuentes RN  Outcome: Progressing  5/2/2024 1729 by Antonia Mary RN  Outcome: Progressing

## 2024-05-02 NOTE — PROGRESS NOTES
VANCO DAILY FOLLOW UP NOTE  [facility]   Pharmacy Pharmacokinetic Monitoring Service - Vancomycin    Consulting Provider: Dr. Lopez   Indication: SSTI  Target Concentration: Goal AUC/JAMILA 400-600 mg*hr/L  Day of Therapy: 2 of 7  Additional Antimicrobials: cefepime    Pertinent Laboratory Values:   Wt Readings from Last 1 Encounters:   05/02/24 74.4 kg (164 lb)     Temp Readings from Last 1 Encounters:   05/02/24 97.3 °F (36.3 °C) (Oral)     Recent Labs     05/01/24  1303 05/01/24  1435 05/01/24  2137 05/02/24  0501   BUN 34*  --   --  33*   CREATININE 1.05  --   --  1.06   WBC 15.0*  --   --  16.7*   PROCAL 0.57*  --   --   --    LACTSEPSIS  --  1.7 2.1*  --      Estimated Creatinine Clearance: 36 mL/min (based on SCr of 1.06 mg/dL).    No results found for: \"VANCOTROUGH\", \"VANCORANDOM\"    MRSA Nasal Swab: N/A. Non-respiratory infection    Assessment:  Date/Time Dose Concentration AUC         Note: Serum concentrations collected for AUC dosing may appear elevated if collected in close proximity to the dose administered, this is not necessarily an indication of toxicity    Plan:  Dosing recommendations based on Bayesian software  Continue vancomycin 1000 mg every 24 hours  Anticipated AUC of 546 and trough concentration of 16.3 at steady state  Renal labs as indicated   Vancomycin concentrations will be ordered as clinically appropriate   Pharmacy will continue to monitor patient and adjust therapy as indicated    Thank you for the consult,  Everardo Tamayo [facility]

## 2024-05-02 NOTE — PROGRESS NOTES
TRANSFER - IN REPORT:    Verbal report received from HALI Ocasio on Brittany Chapin  being received from ED for routine progression of patient care      Report consisted of patient's Situation, Background, Assessment and   Recommendations(SBAR).     Information from the following report(s) Adult Overview, Intake/Output, MAR, and Recent Results was reviewed with the receiving nurse.    Opportunity for questions and clarification was provided.      Assessment completed upon patient's arrival to unit and care assumed.

## 2024-05-02 NOTE — PROGRESS NOTES
Patient in bed alert and oriented, in bed. Patient is a paraplegic lower extremities Voiding via Purewick. Incontinent of bowel and bladder, reports last bowel movement 05/01/2024. Reports pain in the left arm , rates pain 5/10.   09:00 AM   Patient diet change to NPO, tray already at bedside, prior to the order coming through. Patient ate one orange and 1/4 of her ensure. Sign placed in door. Patient is aware she is NPO.   09:30 AM   Positive blood cultures gram negative rodes, proteus , attending notified.  1800  Noted decrease urine output total of 150 cc total this shift, bladder scan yield 150 cc, will have night shift nurse follow up with bladder scan as needed. No bowel movement this shift. Patient continues at baseline alert and oriented, family at bedside.

## 2024-05-02 NOTE — PROGRESS NOTES
Hospitalist Progress Note   Admit Date:  2024 11:49 AM   Name:  Brittany Chapin   Age:  89 y.o.  Sex:  female  :  1933   MRN:  [id number]   Room:  603/    Presenting/Chief Complaint: No chief complaint on file.     Reason(s) for Admission: Tachycardia [R00.0]  Elevated brain natriuretic peptide (BNP) level [R79.89]  Sepsis (HCC) [A41.9]  Leukocytosis, unspecified type [D72.829]     Hospital Course:    89 y.o. female with past medical history of breast cancer with metastatic disease to cervical spine who presented to the ED on 2024 with cc of abdominal pain and chest pain for the last several days. Patient has a history of breast cancer and was noted to have spinal metastatic disease about 3 months ago resulting in paraplegia.  Also noted to have sacral wound over the last month that has continued to progress.  In the ED, labs showed creatinine 1.05, lactic acid 1.7, calcium 11.5, proBNP 1002, WBC 15, and hemoglobin 10.9.  CT showed no acute abnormalities.  She received IV ceftriaxone, IV Lasix.      Subjective & 24hr Events:   Patient was seen and examined at the bedside.  No overnight events.  Patient feeling better this morning.  Patient be assessed by general surgery and wound care today.      Assessment & Plan:   Sepsis due to sacral wound (POA, due to suspected gram positive bacteria)   -As evidenced by HR > 90, RR > 20  -Cefepime and vancomycin  -Consult surgery for possible debridement  -Per general surgery no current plans for surgical intervention at this time  - Wound care recommending moist wound healing at this time using Dakin's  -Patient's sacral wound likely source of leukocytosis  - Wound care to continue to follow     Breast cancer with metastatic disease to the spine  -Analgesics as needed     Hypertension  -Metoprolol  -Received Lasix in the ED  -Resume home losartan, Imdur pending med rec    Urinary retention  - Replace Kellogg  - Flomax     Chronic normocytic anemia  -No  evidence of active bleeding  -Trend CBC     Hypothyroidism  -Synthroid    PT/OT evals and PPD ordered?  Therapy   Diet:  Diet NPO Exceptions are: Sips of Water with Meds  VTE prophylaxis: Lovenox  Code status: DNR      Non-peripheral Lines and Tubes (if present):      External Urinary Catheter (Active)        Telemetry (if present):  Cardiac/Telemetry Monitor On: No        Hospital Problems:  Principal Problem:    Sepsis (HCC)  Resolved Problems:    * No resolved hospital problems. *      Objective:   Patient Vitals for the past 24 hrs:   Temp Pulse Resp BP SpO2   05/02/24 0725 97.3 °F (36.3 °C) 84 16 (!) 125/42 96 %   05/02/24 0345 -- 88 -- (!) 129/43 --   05/02/24 0332 98.2 °F (36.8 °C) 89 18 (!) 136/36 96 %   05/01/24 2152 98.4 °F (36.9 °C) 100 24 (!) 177/61 92 %   05/01/24 2015 -- (!) 102 13 (!) 179/52 --   05/01/24 2000 -- (!) 102 17 (!) 168/63 --   05/01/24 1930 -- (!) 101 24 (!) 168/59 --   05/01/24 1915 -- 100 16 (!) 171/48 --   05/01/24 1900 -- (!) 101 21 (!) 167/48 --   05/01/24 1845 -- (!) 101 20 (!) 153/52 --   05/01/24 1830 -- 98 15 (!) 165/52 --   05/01/24 1815 -- 100 18 (!) 168/52 --   05/01/24 1800 -- 100 18 (!) 173/53 --   05/01/24 1730 -- (!) 104 22 (!) 188/60 --   05/01/24 1715 -- (!) 102 24 (!) 167/50 --   05/01/24 1700 -- (!) 101 21 (!) 161/62 --   05/01/24 1645 -- 98 22 (!) 155/54 --   05/01/24 1615 -- 98 24 (!) 160/50 --   05/01/24 1600 -- 98 25 (!) 162/51 --   05/01/24 1530 -- 97 23 (!) 164/51 --   05/01/24 1500 -- (!) 102 30 (!) 160/58 --   05/01/24 1445 -- 98 15 (!) 154/52 --   05/01/24 1401 -- 90 26 (!) 163/63 --       Oxygen Therapy  SpO2: 96 %  Pulse via Oximetry: 93 beats per minute  O2 Device: None (Room air)    Estimated body mass index is 25.82 kg/m² as calculated from the following:    Height as of this encounter: 1.676 m (5' 6\").    Weight as of this encounter: 72.6 kg (160 lb).    Intake/Output Summary (Last 24 hours) at 5/2/2024 1349  Last data filed at 5/2/2024 1254  Gross per

## 2024-05-02 NOTE — PROGRESS NOTES
ACUTE OCCUPATIONAL THERAPY GOALS:   (Developed with and agreed upon by patient and/or caregiver.)  1. Patient will complete upper body bathing and dressing with minimal assistance and adaptive equipment as needed.   2. Patient will complete log rolling side to side in the bed with minimal assistance for positioning and hygiene.   3. Patient will tolerate 25 minutes of OT treatment with 2-3 rest breaks to increase activity tolerance for ADLs.   4. Patient will complete sitting balance edge of bed with minimal assistance to improve sitting balance for ADL.   5. Patient will complete grooming/self-feeding tasks with SBA to improve participation with self-care.     Timeframe: 7 visits       OCCUPATIONAL THERAPY Initial Assessment, Daily Note, and PM       OT Visit Days: 1  Acknowledge Orders  Time  OT Charge Capture  Rehab Caseload Tracker      Brittany Chapin is a 89 y.o. female   PRIMARY DIAGNOSIS: Sepsis (HCC)  Tachycardia [R00.0]  Elevated brain natriuretic peptide (BNP) level [R79.89]  Sepsis (HCC) [A41.9]  Leukocytosis, unspecified type [D72.829]       Reason for Referral: Generalized Muscle Weakness (M62.81)  Other lack of cordination (R27.8)  Inpatient: Payor: T MEDICARE / Plan: AET MEDICARE-ADVANTAGE PPO / Product Type: Medicare /     ASSESSMENT:     REHAB RECOMMENDATIONS:   Recommendation to date pending progress:  Setting:  Pt return back to the care home    Equipment:    To Be Determined     ASSESSMENT:  Ms. Chapin presents to the hospital with sepsis, tachycardia, elevated BNP, and leukocytosis. Pt's hx is significant for L breast cancer with mets to her spine (niece at bedside reports its her cervical spine). Pt's weakness began to progress with inability to move her lower extremities and no functional movement below her waist. Pt has been getting essentially total care at her care home. Pt reports pain of 5/10 in her L UE. Pt is alert, very pleasant, and appropriate for her age. Pt needed mod-max A to roll but    Sensation []  Impaired    Coordination []       Tone []       Edema []    Activity Tolerance []  Limited by fatigue/weakness      Hand Dominance R [] L []      COGNITION/  PERCEPTION: INTACT IMPAIRED   (See Comments)   Orientation [x]     Vision [x]     Hearing [x]     Cognition  [x]     Perception [x]       MOBILITY: I Mod I S SBA CGA Min Mod Max Total  NT x2 Comments:   Bed Mobility    Rolling [] [] [] [] [] [] [x] [x] [] [] []    Supine to Sit [] [] [] [] [] [] [] [x] [] [] [x]    Scooting [] [] [] [] [] [] [] [x] [x] [] [x]    Sit to Supine [] [] [] [] [] [] [] [x] [] [] [x]    Transfers    Sit to Stand [] [] [] [] [] [] [] [] [] [x] []    Bed to Chair [] [] [] [] [] [] [] [] [] [x] []    Stand to Sit [] [] [] [] [] [] [] [] [] [x] []    Tub/Shower [] [] [] [] [] [] [] [] [] [x] []     Toilet [] [] [] [] [] [] [] [] [] [x] []      [] [] [] [] [] [] [] [] [] [] []    I=Independent, Mod I=Modified Independent, S=Supervision/Setup, SBA=Standby Assistance, CGA=Contact Guard Assistance, Min=Minimal Assistance, Mod=Moderate Assistance, Max=Maximal Assistance, Total=Total Assistance, NT=Not Tested    ACTIVITIES OF DAILY LIVING: I Mod I S SBA CGA Min Mod Max Total NT Comments   BASIC ADLs:              Upper Body Bathing  [] [] [] [] [] [] [] [] [] [x]     Lower Body Bathing [] [] [] [] [] [] [] [] [] [x]     Toileting [] [] [] [] [] [] [] [] [] [x]    Upper Body Dressing [] [] [] [] [] [] [] [] [] [x]    Lower Body Dressing [] [] [] [] [] [] [] [] [] [x]    Feeding [] [] [] [] [] [] [] [] [] [x]    Grooming [] [] [] [] [] [] [] [] [] [x]    Personal Device Care [] [] [] [] [] [] [] [] [] [x]    Functional Mobility [] [] [] [] [] [] [] [x] [] [] X 2 bed mobility    I=Independent, Mod I=Modified Independent, S=Supervision/Setup, SBA=Standby Assistance, CGA=Contact Guard Assistance, Min=Minimal Assistance, Mod=Moderate Assistance, Max=Maximal Assistance, Total=Total Assistance, NT=Not Tested    PLAN:

## 2024-05-02 NOTE — CARE COORDINATION
CM met with Ms. Chapin in room 603 to discuss discharge planning.  She is inpatient status for sepsis and sacral wound.    Prior to admit, Ms. Chapin says she has been living at [facility] ([address] [place] [address] , [place] [address]; [phone]).  She said she has been there for about one month.      As for ADLs, Ms. Chapin says that she cannot walk.  She said that the [facility] staff changes her briefs, and help her with transfers to a manual WC.  She said she eats meals in her room, and does not typically go down to the dining room.  She said that she has had a \"sore on my bottom for about a week.\"      At discharge, Ms. Chapin would like to return to her [facility].  CM will have to check with OT and PT, and then with the [facility]:  Will they take her back with sacral wound and her need for brief changes?  CM to also follow up with her niece.       05/02/24 6194   Service Assessment   Patient Orientation Alert and Oriented   Cognition Alert   History Provided By Patient   Primary Caregiver Other (Comment)  ([facility] staff)   Support Systems Family Members  (niporsche Kurtz)   PCP Verified by CM Yes   Prior Functional Level Assistance with the following:;Bathing;Dressing;Toileting;Cooking;Housework;Shopping;Mobility;Other (see comment)  (patient reports from [facility], and that staff assist her with WC transfers and chaning her briefs)   Current Functional Level Other (see comment)  (TBD)   Can patient return to prior living arrangement Unknown at present   Ability to make needs known: Fair   Family able to assist with home care needs: Yes  (pt from [facility])   Would you like for me to discuss the discharge plan with any other family members/significant others, and if so, who? No   Condition of Participation: Discharge Planning   The Plan for Transition of Care is related to the following treatment goals: TBD; she would like to return to her [facility] ([facility])

## 2024-05-02 NOTE — PROGRESS NOTES
ACUTE PHYSICAL THERAPY GOALS:   (Developed with and agreed upon by patient and/or caregiver.)    (1.) Brittany Chapin  will move from supine to sit and sit to supine  with MODERATE ASSIST within 7 treatment day(s).    (2.) Brittany Chapin  will scoot up and down with MODERATE ASSIST within 7 treatment day(s).    (3.) Brittany Chapin  will roll side to side with MINIMAL ASSIST within 7 treatment day(s).    (4.) Brittany Chapin will transfer from bed to chair and chair to bed with MODERATE ASSIST using the least restrictive device within 7 treatment day(s).    (5.) Brittany Chapin will perform sitting static and dynamic balance activities x 10 minutes with CONTACT GUARD ASSIST to improve safety within 7 treatment day(s).  (6.) Brittany Chapin will perform therapeutic exercises x 10 min for HEP with MINIMAL ASSIST to improve strength, endurance, and functional mobility within 7 treatment day(s).      PHYSICAL THERAPY Initial Assessment, Daily Note, and PM  (Link to Caseload Tracking: PT Visit Days : 1  Acknowledge Orders  Time In/Out  PT Charge Capture  Rehab Caseload Tracker    Brittany Chapin is a 89 y.o. female   PRIMARY DIAGNOSIS: Sepsis (HCC)  Tachycardia [R00.0]  Elevated brain natriuretic peptide (BNP) level [R79.89]  Sepsis (HCC) [A41.9]  Leukocytosis, unspecified type [D72.829]       Reason for Referral: Generalized Muscle Weakness (M62.81)  Inpatient: Payor: [employer] MEDICARE / Plan: AET MEDICARE-ADVANTAGE PPO / Product Type: Medicare /     ASSESSMENT:     REHAB RECOMMENDATIONS:   Recommendation to date pending progress:  Setting:  Return to Assisted Living Facility    Equipment:    To Be Determined     ASSESSMENT:   Ms. Chapin is a 89 year old female who presents to hospital secondary to above diagnosis. Pt presents supine in bed with Niece in room, very supportive, reports that pt has been living in detention for past month, receives assist with ADLS, tranfers and mobility. This date pt performs mobility including sup <> sit with Max A x2

## 2024-05-02 NOTE — WOUND CARE
Metastatic breast cancer, involves spine. Paraplegic.  She states she is thankful for the numbness because it helped her stop hurting.  The wound on sacrum is stable eschar at this time. Goals of care need established, surgery would be difficult to heal from, nutrition and offloading would need to be optimal for healing. The sacral wound is the likely source for leukocytosis,  and debridement may be required at some point recommend moist wound healing at this time using Dakin's. Bandage applied of opticel ag and foam for now.  Will plan to assess again tomorrow.

## 2024-05-02 NOTE — PROGRESS NOTES
Comprehensive Nutrition Assessment    Type and Reason for Visit: Initial, Positive Nutrition Screen  Best Practice Alert for Pressure Injury Stage 2 or greater    Nutrition Recommendations/Plan:   Meals and Snacks:  Diet: Initiate easy to chew per discussion with provider Dr. Franco.    Nutrition Supplement Therapy:  Medical food supplement therapy:  Initiate Ensure Enlive three times per day (this provides 350 kcal and 20 grams protein per bottle) chocolate Oral supplement and water provided at RD visit.      Malnutrition Assessment:  Malnutrition Status: At risk for malnutrition (Comment) (compromised appetite adn intake, +wt loss, bed bound, increased needs r/t wound)     Nutrition Assessment:  Nutrition History: Relocated 4 weeks ago from [place]. The first week she was eating well.  Oral intake then declined due to dislike of food and compromised appetite.  She has been consuming 2 ensure daily.       Do You Have Any Cultural, Uatsdin, or Ethnic Food Preferences?: No   Weight History: No EMR wt hx available.  Pt and family reports #, stated wt on admission 160#.  Potential for 14# wt loss unable to validate.    Nutrition Background:       PMH remarkable for breast cancer with metastatic disease to cervical spine.  Presented with c/o abdominal pain and chest pain for several days.  Admitted with sepsis due to sacral wound POA, breast cancer w metastatic disease to spine, HTN, chronic normocytic anemia, hypothyroidism.    WC 5/2: wound on sacrum is stable eschar   Nutrition Interval:  5/2 surgery note: General Surgery- no current plans for surgical intervention.  Discussed with mariano Wade to advance diet.       Met with pt and family at bedside.  Pt has been requesting to ask for something to drink while NPO.  She relates mainly taking ons and fruits lately.   Discussed with HALI Knight.      Current Nutrition Therapies:  Diet NPO Exceptions are: Sips of Water with Meds    Current Intake:   Average

## 2024-05-03 LAB
ALBUMIN SERPL-MCNC: 1.8 G/DL (ref 3.2–4.6)
ALBUMIN/GLOB SERPL: 0.4 (ref 1–1.9)
ALP SERPL-CCNC: 146 U/L (ref 35–104)
ALT SERPL-CCNC: <5 U/L (ref 12–65)
ANION GAP SERPL CALC-SCNC: 8 MMOL/L (ref 9–18)
APPEARANCE UR: ABNORMAL
AST SERPL-CCNC: 28 U/L (ref 15–37)
BACTERIA URNS QL MICRO: 0 /HPF
BASOPHILS # BLD: 0 K/UL (ref 0–0.2)
BASOPHILS NFR BLD: 0 % (ref 0–2)
BILIRUB SERPL-MCNC: 0.4 MG/DL (ref 0–1.2)
BILIRUB UR QL: NEGATIVE
BUN SERPL-MCNC: 34 MG/DL (ref 8–23)
CALCIUM SERPL-MCNC: 12 MG/DL (ref 8.8–10.2)
CASTS URNS QL MICRO: ABNORMAL /LPF
CHLORIDE SERPL-SCNC: 107 MMOL/L (ref 98–107)
CO2 SERPL-SCNC: 22 MMOL/L (ref 20–28)
COLOR UR: ABNORMAL
CREAT SERPL-MCNC: 1.03 MG/DL (ref 0.6–1.1)
CREAT SERPL-MCNC: 1.06 MG/DL (ref 0.6–1.1)
CRYSTALS URNS QL MICRO: 0 /LPF
DIFFERENTIAL METHOD BLD: ABNORMAL
EOSINOPHIL # BLD: 0.2 K/UL (ref 0–0.8)
EOSINOPHIL NFR BLD: 1 % (ref 0.5–7.8)
EPI CELLS #/AREA URNS HPF: ABNORMAL /HPF
ERYTHROCYTE [DISTWIDTH] IN BLOOD BY AUTOMATED COUNT: 16.8 % (ref 11.9–14.6)
GLOBULIN SER CALC-MCNC: 4.5 G/DL (ref 2.3–3.5)
GLUCOSE SERPL-MCNC: 121 MG/DL (ref 70–99)
GLUCOSE UR STRIP.AUTO-MCNC: NEGATIVE MG/DL
HCT VFR BLD AUTO: 21.4 % (ref 35.8–46.3)
HCT VFR BLD AUTO: 27.7 % (ref 35.8–46.3)
HGB BLD-MCNC: 6.7 G/DL (ref 11.7–15.4)
HGB BLD-MCNC: 8.9 G/DL (ref 11.7–15.4)
HGB UR QL STRIP: NEGATIVE
HISTORY CHECK: NORMAL
IMM GRANULOCYTES # BLD AUTO: 0.2 K/UL (ref 0–0.5)
IMM GRANULOCYTES NFR BLD AUTO: 1 % (ref 0–5)
IRON SATN MFR SERPL: 13 % (ref 20–50)
IRON SERPL-MCNC: 20 UG/DL (ref 35–100)
KETONES UR QL STRIP.AUTO: ABNORMAL MG/DL
LEUKOCYTE ESTERASE UR QL STRIP.AUTO: NEGATIVE
LYMPHOCYTES # BLD: 2.5 K/UL (ref 0.5–4.6)
LYMPHOCYTES NFR BLD: 14 % (ref 13–44)
MCH RBC QN AUTO: 27.2 PG (ref 26.1–32.9)
MCHC RBC AUTO-ENTMCNC: 31.3 G/DL (ref 31.4–35)
MCV RBC AUTO: 87 FL (ref 82–102)
MM INDURATION, POC: 0 MM (ref 0–5)
MONOCYTES # BLD: 1.9 K/UL (ref 0.1–1.3)
MONOCYTES NFR BLD: 11 % (ref 4–12)
MUCOUS THREADS URNS QL MICRO: 0 /LPF
NEUTS SEG # BLD: 13.2 K/UL (ref 1.7–8.2)
NEUTS SEG NFR BLD: 73 % (ref 43–78)
NITRITE UR QL STRIP.AUTO: NEGATIVE
NRBC # BLD: 0.02 K/UL (ref 0–0.2)
OTHER OBSERVATIONS: ABNORMAL
PH UR STRIP: 5.5 (ref 5–9)
PLATELET # BLD AUTO: 401 K/UL (ref 150–450)
PMV BLD AUTO: 9.5 FL (ref 9.4–12.3)
POTASSIUM SERPL-SCNC: 4.3 MMOL/L (ref 3.5–5.1)
PPD, POC: NEGATIVE
PROT SERPL-MCNC: 6.3 G/DL (ref 6.3–8.2)
PROT UR STRIP-MCNC: 30 MG/DL
RBC # BLD AUTO: 2.46 M/UL (ref 4.05–5.2)
RBC #/AREA URNS HPF: ABNORMAL /HPF
SODIUM SERPL-SCNC: 137 MMOL/L (ref 136–145)
SP GR UR REFRACTOMETRY: 1.03 (ref 1–1.02)
TIBC SERPL-MCNC: 159 UG/DL (ref 240–450)
UIBC SERPL-MCNC: 139 UG/DL (ref 112–347)
URINE CULTURE IF INDICATED: ABNORMAL
UROBILINOGEN UR QL STRIP.AUTO: 0.2 EU/DL (ref 0.2–1)
VANCOMYCIN SERPL-MCNC: 15.4 UG/ML
WBC # BLD AUTO: 18 K/UL (ref 4.3–11.1)
WBC URNS QL MICRO: ABNORMAL /HPF

## 2024-05-03 PROCEDURE — 85018 HEMOGLOBIN: CPT

## 2024-05-03 PROCEDURE — 51701 INSERT BLADDER CATHETER: CPT

## 2024-05-03 PROCEDURE — 80053 COMPREHEN METABOLIC PANEL: CPT

## 2024-05-03 PROCEDURE — P9016 RBC LEUKOCYTES REDUCED: HCPCS

## 2024-05-03 PROCEDURE — 36430 TRANSFUSION BLD/BLD COMPNT: CPT

## 2024-05-03 PROCEDURE — 85025 COMPLETE CBC W/AUTO DIFF WBC: CPT

## 2024-05-03 PROCEDURE — 97530 THERAPEUTIC ACTIVITIES: CPT

## 2024-05-03 PROCEDURE — 81001 URINALYSIS AUTO W/SCOPE: CPT

## 2024-05-03 PROCEDURE — 2580000003 HC RX 258: Performed by: STUDENT IN AN ORGANIZED HEALTH CARE EDUCATION/TRAINING PROGRAM

## 2024-05-03 PROCEDURE — 86901 BLOOD TYPING SEROLOGIC RH(D): CPT

## 2024-05-03 PROCEDURE — 36415 COLL VENOUS BLD VENIPUNCTURE: CPT

## 2024-05-03 PROCEDURE — 6360000002 HC RX W HCPCS: Performed by: INTERNAL MEDICINE

## 2024-05-03 PROCEDURE — 83540 ASSAY OF IRON: CPT

## 2024-05-03 PROCEDURE — 86900 BLOOD TYPING SEROLOGIC ABO: CPT

## 2024-05-03 PROCEDURE — 6370000000 HC RX 637 (ALT 250 FOR IP): Performed by: STUDENT IN AN ORGANIZED HEALTH CARE EDUCATION/TRAINING PROGRAM

## 2024-05-03 PROCEDURE — 80202 ASSAY OF VANCOMYCIN: CPT

## 2024-05-03 PROCEDURE — 99497 ADVNCD CARE PLAN 30 MIN: CPT | Performed by: NURSE PRACTITIONER

## 2024-05-03 PROCEDURE — 86923 COMPATIBILITY TEST ELECTRIC: CPT

## 2024-05-03 PROCEDURE — 30233N1 TRANSFUSION OF NONAUTOLOGOUS RED BLOOD CELLS INTO PERIPHERAL VEIN, PERCUTANEOUS APPROACH: ICD-10-PCS | Performed by: STUDENT IN AN ORGANIZED HEALTH CARE EDUCATION/TRAINING PROGRAM

## 2024-05-03 PROCEDURE — 1100000000 HC RM PRIVATE

## 2024-05-03 PROCEDURE — 86850 RBC ANTIBODY SCREEN: CPT

## 2024-05-03 PROCEDURE — 51798 US URINE CAPACITY MEASURE: CPT

## 2024-05-03 PROCEDURE — 83550 IRON BINDING TEST: CPT

## 2024-05-03 PROCEDURE — 2580000003 HC RX 258: Performed by: INTERNAL MEDICINE

## 2024-05-03 PROCEDURE — 85014 HEMATOCRIT: CPT

## 2024-05-03 PROCEDURE — 6360000002 HC RX W HCPCS: Performed by: STUDENT IN AN ORGANIZED HEALTH CARE EDUCATION/TRAINING PROGRAM

## 2024-05-03 PROCEDURE — 87040 BLOOD CULTURE FOR BACTERIA: CPT

## 2024-05-03 PROCEDURE — 99222 1ST HOSP IP/OBS MODERATE 55: CPT | Performed by: NURSE PRACTITIONER

## 2024-05-03 RX ORDER — SODIUM CHLORIDE 9 MG/ML
INJECTION, SOLUTION INTRAVENOUS PRN
Status: DISCONTINUED | OUTPATIENT
Start: 2024-05-03 | End: 2024-05-10 | Stop reason: HOSPADM

## 2024-05-03 RX ADMIN — DAKIN'S SOLUTION 0.125% (QUARTER STRENGTH): 0.12 SOLUTION at 13:00

## 2024-05-03 RX ADMIN — SODIUM CHLORIDE, POTASSIUM CHLORIDE, SODIUM LACTATE AND CALCIUM CHLORIDE: 600; 310; 30; 20 INJECTION, SOLUTION INTRAVENOUS at 18:54

## 2024-05-03 RX ADMIN — SODIUM CHLORIDE, PRESERVATIVE FREE 10 ML: 5 INJECTION INTRAVENOUS at 09:45

## 2024-05-03 RX ADMIN — SODIUM CHLORIDE, POTASSIUM CHLORIDE, SODIUM LACTATE AND CALCIUM CHLORIDE: 600; 310; 30; 20 INJECTION, SOLUTION INTRAVENOUS at 04:44

## 2024-05-03 RX ADMIN — SODIUM CHLORIDE: 9 INJECTION, SOLUTION INTRAVENOUS at 09:54

## 2024-05-03 RX ADMIN — METOPROLOL SUCCINATE 50 MG: 50 TABLET, EXTENDED RELEASE ORAL at 09:44

## 2024-05-03 RX ADMIN — VANCOMYCIN HYDROCHLORIDE 1000 MG: 1 INJECTION, POWDER, LYOPHILIZED, FOR SOLUTION INTRAVENOUS at 20:42

## 2024-05-03 RX ADMIN — LEVOTHYROXINE SODIUM 50 MCG: 0.05 TABLET ORAL at 05:23

## 2024-05-03 RX ADMIN — CEFEPIME 2000 MG: 2 INJECTION, POWDER, FOR SOLUTION INTRAVENOUS at 09:54

## 2024-05-03 RX ADMIN — SODIUM CHLORIDE, PRESERVATIVE FREE 10 ML: 5 INJECTION INTRAVENOUS at 20:40

## 2024-05-03 RX ADMIN — ENOXAPARIN SODIUM 40 MG: 100 INJECTION SUBCUTANEOUS at 09:45

## 2024-05-03 RX ADMIN — CEFTRIAXONE SODIUM 2000 MG: 2 INJECTION, POWDER, FOR SOLUTION INTRAMUSCULAR; INTRAVENOUS at 18:27

## 2024-05-03 ASSESSMENT — PAIN DESCRIPTION - LOCATION: LOCATION: BACK

## 2024-05-03 ASSESSMENT — PAIN SCALES - WONG BAKER: WONGBAKER_NUMERICALRESPONSE: HURTS EVEN MORE

## 2024-05-03 NOTE — CONSULTS
Palliative Care    Patient: Brittany Chapin MRN: [id number]  SSN: [id number]    YOB: 1933  Age: 89 y.o.  Sex: female       Date of Request: 5/2/2024  Date of Consult:  5/3/2024  Reason for Consult:  goals of care  Requesting Physician: Dr Franco      Assessment/Plan:     Principal Diagnosis:    Debility, Unspecified  R53.81    Additional Diagnoses:   Advance Care Planning Counseling Z71.89  Frailty  R54  Pain, general  R52  Sepsis, Unspecified Organism:  A41.9  Counseling, Encounter for Medical Advice  Z71.9  Encounter for Palliative Care  Z51.5    Palliative Performance Scale (PPS):       Medical Decision Making:   Reviewed and summarized notes from admission to present   Discussed case with appropriate providers  Reviewed laboratory and x-ray data from admission to present     Pt resting in bed, no distress noted. No visitors at bedside. Introduced role of PC and reviewed events. Pt has very good understanding of her condition.  She states she has bacteria in her blood from the wound on her bottom, and it can be treated with antibiotics.  Pt reports intermittent pain from the sacral wound, and takes Tylenol with relief of the pain. Pt denies other symptoms or concerns at present.    In addition to the E&M time spent above, an additional 18 minutes was spent on ACP.  Reviewed pt's goals of care moving forward. Pt states she wants the infection and wound treated.  She is open to surgery if needed.  Counseled that hospice had been recommended due to her metastatic breast cancer.  Counseled on the hospice philosophy, and levels of care.  Pt states she is not interested in hospice at this time, but would consider it in the future.  Confirmed DNR status, and legal decision maker (Lyric Kurtz).      Will discuss findings with members of the interdisciplinary team.      Thank you for this referral.          .    Subjective:     History obtained from:  Patient and Chart    Chief Complaint: Sepsis, Sacral

## 2024-05-03 NOTE — WOUND CARE
Soft adherent eschar, areas were soft and debridement was possible, discussed with patient she gave permission.  Areas cleansed with betadine, #15 blade used to hash iftikhar eschar and remove the majority of the eschar a small cavity was encountered with moderate amount of gray serosanguinous purulent mix drainage, with foul odor encountered, the slough was removed and adipose was exposed, no bleeding, small oozing from slough removed areas, pressure held.  Wound cleansed out with Dakin's and Dakin's moist gauze packed covered with silicone foam. Wound after debridement measure 4x3x3 with 2cm undermining from 12-3 o'clock and 1cm from 4-6 o'clock. Will need daily dakin's dressings, will plan to assess again next week. If areas are clean may recommend wound vac. Will add low air loss mattress. Will need dedicated help to turn at home, also will need high protein to heal.

## 2024-05-03 NOTE — PROGRESS NOTES
VANCO DAILY FOLLOW UP NOTE  [facility]   Pharmacy Pharmacokinetic Monitoring Service - Vancomycin    Consulting Provider: Dr. Lopez   Indication: SSTI/staph epi and proteus bacteremia  Target Concentration: Goal AUC/JAMILA 400-600 mg*hr/L  Day of Therapy: 3 of 7  Additional Antimicrobials: ceftriaxone    Pertinent Laboratory Values:   Wt Readings from Last 1 Encounters:   05/02/24 74.4 kg (164 lb)     Temp Readings from Last 1 Encounters:   05/03/24 98.4 °F (36.9 °C) (Oral)     Recent Labs     05/01/24  1303 05/01/24  1435 05/01/24  2137 05/02/24  0501 05/03/24  0537 05/03/24  0808   BUN 34*  --   --  33*  --  34*   CREATININE 1.05  --   --  1.06 1.06 1.03   WBC 15.0*  --   --  16.7*  --  18.0*   PROCAL 0.57*  --   --   --   --   --    LACTSEPSIS  --  1.7 2.1*  --   --   --      Estimated Creatinine Clearance: 37 mL/min (based on SCr of 1.03 mg/dL).    Lab Results   Component Value Date/Time    VANCORANDOM 15.4 05/03/2024 05:37 AM       MRSA Nasal Swab: N/A. Non-respiratory infection    Assessment:  Date/Time Dose Concentration AUC   5/3 0537 1000 mg Q24H 15.4 556   Note: Serum concentrations collected for AUC dosing may appear elevated if collected in close proximity to the dose administered, this is not necessarily an indication of toxicity    Plan:  Dosing recommendations based on Bayesian software  Continue vancomycin 1000 mg every 24 hours as regimen therapeutic.  Renal labs as indicated   Vancomycin concentrations will be ordered as clinically appropriate   Pharmacy will continue to monitor patient and adjust therapy as indicated    Thank you for the consult,  Everardo Tamayo [facility]

## 2024-05-03 NOTE — PROGRESS NOTES
ACUTE PHYSICAL THERAPY GOALS:   (Developed with and agreed upon by patient and/or caregiver.)  (1.) Brittany Chapin  will move from supine to sit and sit to supine  with MODERATE ASSIST within 7 treatment day(s).    (2.) Brittany Chapin  will scoot up and down with MODERATE ASSIST within 7 treatment day(s).    (3.) Brittany Chapin  will roll side to side with MINIMAL ASSIST within 7 treatment day(s).    (4.) Brittany Chapin will transfer from bed to chair and chair to bed with MODERATE ASSIST using the least restrictive device within 7 treatment day(s).    (5.) Brittany Chapin will perform sitting static and dynamic balance activities x 10 minutes with CONTACT GUARD ASSIST to improve safety within 7 treatment day(s).  (6.) Brittany Chapin will perform therapeutic exercises x 10 min for HEP with MINIMAL ASSIST to improve strength, endurance, and functional mobility within 7 treatment day(s).     PHYSICAL THERAPY: Daily Note PM   (Link to Caseload Tracking: PT Visit Days : 2  Time In/Out PT Charge Capture  Rehab Caseload Tracker  Orders    Brittany Chapin is a 89 y.o. female   PRIMARY DIAGNOSIS: Sepsis (HCC)  Tachycardia [R00.0]  Elevated brain natriuretic peptide (BNP) level [R79.89]  Sepsis (HCC) [A41.9]  Leukocytosis, unspecified type [D72.829]       Inpatient: Payor: [employer] MEDICARE / Plan: [facility] MEDICARE-ADVANTAGE PPO / Product Type: Medicare /     ASSESSMENT:     REHAB RECOMMENDATIONS:   Recommendation to date pending progress:  Setting:  Return to senior living    Equipment:    To Be Determined     ASSESSMENT:  Ms. Chapin presents supine in bed with RN and niece in room, agreeable to sit on EOB. Pt required Max A with sup to sit secondary to decreased strength and balance, required Min/Mod A with sitting balance, pt was able to tolerate approx 3-4 mins of sitting before needing to return to supine secondary to fatigue. Pt required max A with sit to supine, then max A x2 with scooting up in bed and repositioning for comfort and safety.Pt left  supine in bed with RN and niece in room, needs within reach, limited progress, will continue to follow     SUBJECTIVE:   Ms. Chapin states, \"I need to lay back down\"     Social/Functional Additional Comments: From Assisted Living Facility where pt was recieving assist with ADLs, t/f and mob  OBJECTIVE:     PAIN: VITALS / O2: PRECAUTION / LINES / DRAINS:   Pre Treatment:   Pain Assessment: Cox-Baker FACES  Cox-Baker Pain Rating: Hurts even more  Pain Location: Back  Non-Pharmaceutical Pain Intervention(s): Repositioned  Response to Pain Intervention: Patient satisfied      Post Treatment: none stated Vitals        Oxygen    External Catheter and IV    RESTRICTIONS/PRECAUTIONS:  Restrictions/Precautions  Restrictions/Precautions: Fall Risk  Restrictions/Precautions: Fall Risk     MOBILITY: I Mod I S SBA CGA Min Mod Max Total  NT x2 Comments:   Bed Mobility    Rolling [] [] [] [] [] [] [] [x] [] [] []    Supine to Sit [] [] [] [] [] [] [] [x] [] [] []    Scooting [] [] [] [] [] [] [] [x] [] [] [x]    Sit to Supine [] [] [] [] [] [] [] [x] [] [] []    Transfers    Sit to Stand [] [] [] [] [] [] [] [] [] [] []    Bed to Chair [] [] [] [] [] [] [] [] [] [] []    Stand to Sit [] [] [] [] [] [] [] [] [] [] []     [] [] [] [] [] [] [] [] [] [] []    I=Independent, Mod I=Modified Independent, S=Supervision, SBA=Standby Assistance, CGA=Contact Guard Assistance,   Min=Minimal Assistance, Mod=Moderate Assistance, Max=Maximal Assistance, Total=Total Assistance, NT=Not Tested    BALANCE: Good Fair+ Fair Fair- Poor NT Comments   Sitting Static [] [] [x] [x] [] []    Sitting Dynamic [] [] [] [] [] []              Standing Static [] [] [] [] [] []    Standing Dynamic [] [] [] [] [] []      GAIT: I Mod I S SBA CGA Min Mod Max Total  NT x2 Comments:   Level of Assistance [] [] [] [] [] [] [] [] [] [x] []    Distance   feet    DME N/A    Gait Quality N/A    Weightbearing Status      Stairs      I=Independent, Mod I=Modified

## 2024-05-03 NOTE — PROGRESS NOTES
Blood transfusion complete. Pt tolerate well. No s/s of adverse reaction. Pt is more alert compared to earlier during the shift. Labs ordered for recollect in 1 hr.

## 2024-05-03 NOTE — PROGRESS NOTES
Dressing changed to buttocks with wound care as ordered. Niece is at the bedside. Black wound noted bilateral heels. No drainage noted. Heel boots and alevyn applied.

## 2024-05-03 NOTE — PROGRESS NOTES
General surgery is signing off. Please call with acute surgical needs.    Continue local wound care and sacral pressure offloading.    Wound care: Clean sacral wound with Dakin's or NS apply Dakin's moist guaze to wound cover with dry guaze and silicone border, change daily.     Per Dr. Jordan (surgeon) 5/2/24:  89-year-old female with multiple comorbidities and a sacral wound General surgery has been consulted on. There is no active necrotic process or fluctuance involving the wound. There is ongoing wound care involving Dakin's solution dressing changes. At this point there is discussion regarding comfort care and/or hospice and I would hold off on any operative debridement since it is not urgently required at this point.   Sacral wound 5/2/24

## 2024-05-03 NOTE — CONSULTS
Infectious Disease Consult    Today's Date: 5/3/2024   Admit Date: 2024  : 1933    Impression:   Proteus bacteremia  S epidermidis bacteremia  Metastatic breast CA with resulting paraplegia  Unstageable sacral ulcer    Plan:   Continue vancomycin for at least 5-7 days.  I will change to ceftriaxone 2g IV daily based on local antibiogram  Goals of care are not clarified to me at this point.  Appropriate to involve palliative care and consider hospice.    Anti-infectives:   Vancomycin (2024 - )  Cefepime (2024 - )    Subjective:   Date of Consultation:  May 3, 2024  Referring Physician: Gio Franco MD for: assistance in management of bacteremia    Patient is a 89 y.o. female with a history of metastatic breast cancer to cervical spine causing paraplegia.  I have limited history.  She is from [place] and was just recently brought to [place] by her niece who lives in [place].  She presented to the ED on 2024 with abdominal pain and chest pain over several days.  She also has a sacral wound.  CT abdomen/pelvis was unremarkable.  She was started on ceftriaxone in the ED.  Blood cultures are growing GNR from 1 anaerobic bottle from peripheral site and GPC from a separate peripheral site.  PCR has identified S epidermidis.  Another PCR has identified proteus (this is labeled on the date of 2024, but it is my impression that it is from 2024). She was started on cefepime and vancomycin in the ED.  General surgery has evaluated the sacral wound and recommended no debridement.  She has been afebrile since admission.  WBC has trended up to 18k.  She is able to give some history, but is not able to provide a high level of detail.  From what I can gather, it sounds like she has received radiation to her spine, but declined chemotherapy.         No past medical history on file.    Social History     Tobacco Use    Smoking status: Never    Smokeless tobacco: Never   Substance Use Topics     findings. Gallbladder is absent. No biliary ductal dilatation. Benign bilateral small renal cysts. No suspicious solid renal mass. No hydronephrosis. No obstructive uropathy or urinary tract stones. No mass, adenopathy, or abnormal fluid collections. No free fluid or inflammatory changes. No mechanical bowel obstruction. Previous hysterectomy. Symmetry pelvic fat and fascial planes. No free fluid. No free air. No pelvic lymphadenopathy or inguinal hernia. Remainder of findings as above. If there are any questions about this report, I can be reached on [employer] or at [phone]        Echocardiogram:  No results found for this or any previous visit.      Signed By: Damian Cifuentes MD     May 3, 2024      Part of this note may have been written by using a voice dictation software.  The note has been proof read but may still contain some grammatical/other typographical errors.

## 2024-05-03 NOTE — PROGRESS NOTES
Bilirubin 0.5 0.0 - 1.2 MG/DL    ALT 6 (L) 12 - 65 U/L    AST 26 15 - 37 U/L    Alk Phosphatase 146 (H) 35 - 104 U/L    Total Protein 6.5 6.3 - 8.2 g/dL    Albumin 2.0 (L) 3.2 - 4.6 g/dL    Globulin 4.5 (H) 2.3 - 3.5 g/dL    Albumin/Globulin Ratio 0.4 (L) 1.0 - 1.9     CBC with Auto Differential    Collection Time: 05/02/24  5:01 AM   Result Value Ref Range    WBC 16.7 (H) 4.3 - 11.1 K/uL    RBC 2.63 (L) 4.05 - 5.2 M/uL    Hemoglobin 7.1 (L) 11.7 - 15.4 g/dL    Hematocrit 23.1 (L) 35.8 - 46.3 %    MCV 87.8 82 - 102 FL    MCH 27.0 26.1 - 32.9 PG    MCHC 30.7 (L) 31.4 - 35.0 g/dL    RDW 16.8 (H) 11.9 - 14.6 %    Platelets 380 150 - 450 K/uL    MPV 9.7 9.4 - 12.3 FL    nRBC 0.02 0.0 - 0.2 K/uL    Differential Type AUTOMATED      Neutrophils % 69 43 - 78 %    Lymphocytes % 16 13 - 44 %    Monocytes % 14 (H) 4.0 - 12.0 %    Eosinophils % 0 (L) 0.5 - 7.8 %    Basophils % 0 0.0 - 2.0 %    Immature Granulocytes % 1 0.0 - 5.0 %    Neutrophils Absolute 11.4 (H) 1.7 - 8.2 K/UL    Lymphocytes Absolute 2.7 0.5 - 4.6 K/UL    Monocytes Absolute 2.4 (H) 0.1 - 1.3 K/UL    Eosinophils Absolute 0.1 0.0 - 0.8 K/UL    Basophils Absolute 0.0 0.0 - 0.2 K/UL    Immature Granulocytes Absolute 0.1 0.0 - 0.5 K/UL       No results for input(s): \"COVID19\" in the last 72 hours.    Current Meds:  Current Facility-Administered Medications   Medication Dose Route Frequency    levothyroxine (SYNTHROID) tablet 50 mcg  50 mcg Oral Daily    metoprolol succinate (TOPROL XL) extended release tablet 50 mg  50 mg Oral Daily    HYDROcodone-acetaminophen (NORCO) 5-325 MG per tablet 1 tablet  1 tablet Oral Q6H PRN    morphine injection 2 mg  2 mg IntraVENous Q4H PRN    tuberculin injection 5 Units  5 Units IntraDERmal Once    lactated ringers IV soln infusion   IntraVENous Continuous    ceFEPIme (MAXIPIME) 2,000 mg in sterile water 20 mL IV syringe  2,000 mg IntraVENous Once    ceFEPIme (MAXIPIME) 2,000 mg in sodium chloride 0.9 % 100 mL IVPB (mini-bag)   2,000 mg IntraVENous Q12H    [START ON 5/3/2024] sodium hypochlorite (DAKINS) 0.125 % external solution   Irrigation Daily    sodium chloride flush 0.9 % injection 5-40 mL  5-40 mL IntraVENous 2 times per day    sodium chloride flush 0.9 % injection 5-40 mL  5-40 mL IntraVENous PRN    0.9 % sodium chloride infusion   IntraVENous PRN    enoxaparin (LOVENOX) injection 40 mg  40 mg SubCUTAneous Daily    ondansetron (ZOFRAN-ODT) disintegrating tablet 4 mg  4 mg Oral Q8H PRN    Or    ondansetron (ZOFRAN) injection 4 mg  4 mg IntraVENous Q6H PRN    polyethylene glycol (GLYCOLAX) packet 17 g  17 g Oral Daily PRN    acetaminophen (TYLENOL) tablet 650 mg  650 mg Oral Q6H PRN    Or    acetaminophen (TYLENOL) suppository 650 mg  650 mg Rectal Q6H PRN    vancomycin (VANCOCIN) 1500 mg in sodium chloride 0.9 % 250 mL IVPB  1,500 mg IntraVENous Once    vancomycin (VANCOCIN) 750 mg in sodium chloride 0.9 % 250 mL IVPB ([id number])  750 mg IntraVENous Q24H       Signed:  Gio Franco MD    Part of this note may have been written by using a voice dictation software.  The note has been proof read but may still contain some grammatical/other typographical errors.

## 2024-05-03 NOTE — CARE COORDINATION
Dispo update:  ANASTACIO spoke to [facility] liaison MsFrancisco Javier Evelyn Li.  Ms. Chapin is current with [facility] home [facility].  CM wrote RICKY order and \"accepted and selected\" in Epic.

## 2024-05-04 PROBLEM — L89.153 DECUBITUS ULCER OF SACRAL REGION, STAGE 3 (HCC): Status: ACTIVE | Noted: 2024-05-04

## 2024-05-04 PROBLEM — D64.9 ANEMIA: Status: ACTIVE | Noted: 2024-05-04

## 2024-05-04 PROBLEM — I10 HYPERTENSION: Status: ACTIVE | Noted: 2024-05-04

## 2024-05-04 PROBLEM — D62 ACUTE BLOOD LOSS ANEMIA: Status: ACTIVE | Noted: 2024-05-04

## 2024-05-04 PROBLEM — C50.919 BREAST CANCER METASTASIZED TO BONE (HCC): Status: ACTIVE | Noted: 2024-05-04

## 2024-05-04 PROBLEM — G82.20 PARAPLEGIA (HCC): Status: ACTIVE | Noted: 2024-05-04

## 2024-05-04 PROBLEM — R78.81 GRAM-NEGATIVE BACTEREMIA: Status: ACTIVE | Noted: 2024-05-04

## 2024-05-04 PROBLEM — E03.9 HYPOTHYROIDISM: Status: ACTIVE | Noted: 2024-05-04

## 2024-05-04 PROBLEM — E83.52 HYPERCALCEMIA: Status: ACTIVE | Noted: 2024-05-04

## 2024-05-04 PROBLEM — C79.51 BREAST CANCER METASTASIZED TO BONE (HCC): Status: ACTIVE | Noted: 2024-05-04

## 2024-05-04 LAB
25(OH)D3 SERPL-MCNC: 52.7 NG/ML (ref 30–100)
ABO + RH BLD: NORMAL
ALBUMIN SERPL-MCNC: 1.7 G/DL (ref 3.2–4.6)
ALBUMIN/GLOB SERPL: 0.4 (ref 1–1.9)
ALP SERPL-CCNC: 152 U/L (ref 35–104)
ALT SERPL-CCNC: <5 U/L (ref 12–65)
ANION GAP SERPL CALC-SCNC: 9 MMOL/L (ref 9–18)
AST SERPL-CCNC: 28 U/L (ref 15–37)
BACTERIA SPEC CULT: ABNORMAL
BASOPHILS # BLD: 0.1 K/UL (ref 0–0.2)
BASOPHILS NFR BLD: 0 % (ref 0–2)
BILIRUB SERPL-MCNC: 0.3 MG/DL (ref 0–1.2)
BLD PROD TYP BPU: NORMAL
BLOOD BANK BLOOD PRODUCT EXPIRATION DATE: NORMAL
BLOOD BANK DISPENSE STATUS: NORMAL
BLOOD BANK ISBT PRODUCT BLOOD TYPE: 5100
BLOOD BANK PRODUCT CODE: NORMAL
BLOOD BANK UNIT TYPE AND RH: NORMAL
BLOOD GROUP ANTIBODIES SERPL: NORMAL
BPU ID: NORMAL
BUN SERPL-MCNC: 30 MG/DL (ref 8–23)
CALCIUM SERPL-MCNC: 11.4 MG/DL (ref 8.8–10.2)
CALCIUM SERPL-MCNC: 12 MG/DL (ref 8.8–10.2)
CHLORIDE SERPL-SCNC: 109 MMOL/L (ref 98–107)
CO2 SERPL-SCNC: 22 MMOL/L (ref 20–28)
CREAT SERPL-MCNC: 0.94 MG/DL (ref 0.6–1.1)
CROSSMATCH RESULT: NORMAL
CRP SERPL HS-MCNC: >300 MG/L (ref 0–3)
DAT POLY-SP REAG RBC QL: NORMAL
DIFFERENTIAL METHOD BLD: ABNORMAL
EOSINOPHIL # BLD: 0.3 K/UL (ref 0–0.8)
EOSINOPHIL NFR BLD: 2 % (ref 0.5–7.8)
ERYTHROCYTE [DISTWIDTH] IN BLOOD BY AUTOMATED COUNT: 16.3 % (ref 11.9–14.6)
ERYTHROCYTE [SEDIMENTATION RATE] IN BLOOD: 93 MM/HR (ref 0–30)
FERRITIN SERPL-MCNC: 471 NG/ML (ref 8–388)
GLOBULIN SER CALC-MCNC: 4.2 G/DL (ref 2.3–3.5)
GLUCOSE SERPL-MCNC: 137 MG/DL (ref 70–99)
GRAM STN SPEC: ABNORMAL
HCT VFR BLD AUTO: 25.6 % (ref 35.8–46.3)
HGB BLD-MCNC: 8.2 G/DL (ref 11.7–15.4)
HGB RETIC QN AUTO: 25 PG (ref 29–35)
IMM GRANULOCYTES # BLD AUTO: 0.2 K/UL (ref 0–0.5)
IMM GRANULOCYTES NFR BLD AUTO: 1 % (ref 0–5)
IMM RETICS NFR: 33.3 % (ref 3–15.9)
LDH SERPL L TO P-CCNC: 173 U/L (ref 127–281)
LYMPHOCYTES # BLD: 2.6 K/UL (ref 0.5–4.6)
LYMPHOCYTES NFR BLD: 18 % (ref 13–44)
MCH RBC QN AUTO: 27.5 PG (ref 26.1–32.9)
MCHC RBC AUTO-ENTMCNC: 32 G/DL (ref 31.4–35)
MCV RBC AUTO: 85.9 FL (ref 82–102)
MM INDURATION, POC: 0 MM (ref 0–5)
MM INDURATION, POC: 0 MM (ref 0–5)
MONOCYTES # BLD: 1.6 K/UL (ref 0.1–1.3)
MONOCYTES NFR BLD: 11 % (ref 4–12)
NEUTS SEG # BLD: 9.8 K/UL (ref 1.7–8.2)
NEUTS SEG NFR BLD: 67 % (ref 43–78)
NRBC # BLD: 0 K/UL (ref 0–0.2)
PLATELET # BLD AUTO: 357 K/UL (ref 150–450)
PMV BLD AUTO: 9.7 FL (ref 9.4–12.3)
POTASSIUM SERPL-SCNC: 4.3 MMOL/L (ref 3.5–5.1)
PPD, POC: NEGATIVE
PPD, POC: NEGATIVE
PROT SERPL-MCNC: 5.9 G/DL (ref 6.3–8.2)
PTH-INTACT SERPL-MCNC: <1.2 PG/ML (ref 15–65)
RBC # BLD AUTO: 2.98 M/UL (ref 4.05–5.2)
RETICS # AUTO: 0.06 M/UL (ref 0.03–0.1)
RETICS/RBC NFR AUTO: 1.9 % (ref 0.3–2)
SERVICE CMNT-IMP: ABNORMAL
SODIUM SERPL-SCNC: 140 MMOL/L (ref 136–145)
SPECIMEN EXP DATE BLD: NORMAL
UNIT DIVISION: 0
UNIT ISSUE DATE/TIME: NORMAL
VIT B12 SERPL-MCNC: 3018 PG/ML (ref 193–986)
WBC # BLD AUTO: 14.5 K/UL (ref 4.3–11.1)

## 2024-05-04 PROCEDURE — 82306 VITAMIN D 25 HYDROXY: CPT

## 2024-05-04 PROCEDURE — 6360000002 HC RX W HCPCS: Performed by: STUDENT IN AN ORGANIZED HEALTH CARE EDUCATION/TRAINING PROGRAM

## 2024-05-04 PROCEDURE — 82728 ASSAY OF FERRITIN: CPT

## 2024-05-04 PROCEDURE — 2580000003 HC RX 258: Performed by: INTERNAL MEDICINE

## 2024-05-04 PROCEDURE — 80053 COMPREHEN METABOLIC PANEL: CPT

## 2024-05-04 PROCEDURE — 83970 ASSAY OF PARATHORMONE: CPT

## 2024-05-04 PROCEDURE — 86880 COOMBS TEST DIRECT: CPT

## 2024-05-04 PROCEDURE — 2580000003 HC RX 258: Performed by: STUDENT IN AN ORGANIZED HEALTH CARE EDUCATION/TRAINING PROGRAM

## 2024-05-04 PROCEDURE — 6370000000 HC RX 637 (ALT 250 FOR IP): Performed by: STUDENT IN AN ORGANIZED HEALTH CARE EDUCATION/TRAINING PROGRAM

## 2024-05-04 PROCEDURE — 86141 C-REACTIVE PROTEIN HS: CPT

## 2024-05-04 PROCEDURE — 1100000000 HC RM PRIVATE

## 2024-05-04 PROCEDURE — 83010 ASSAY OF HAPTOGLOBIN QUANT: CPT

## 2024-05-04 PROCEDURE — 85025 COMPLETE CBC W/AUTO DIFF WBC: CPT

## 2024-05-04 PROCEDURE — 6360000002 HC RX W HCPCS: Performed by: INTERNAL MEDICINE

## 2024-05-04 PROCEDURE — 83615 LACTATE (LD) (LDH) ENZYME: CPT

## 2024-05-04 PROCEDURE — 82607 VITAMIN B-12: CPT

## 2024-05-04 PROCEDURE — 84165 PROTEIN E-PHORESIS SERUM: CPT

## 2024-05-04 PROCEDURE — 85652 RBC SED RATE AUTOMATED: CPT

## 2024-05-04 PROCEDURE — 36415 COLL VENOUS BLD VENIPUNCTURE: CPT

## 2024-05-04 PROCEDURE — 82784 ASSAY IGA/IGD/IGG/IGM EACH: CPT

## 2024-05-04 PROCEDURE — 85046 RETICYTE/HGB CONCENTRATE: CPT

## 2024-05-04 PROCEDURE — 82397 CHEMILUMINESCENT ASSAY: CPT

## 2024-05-04 PROCEDURE — 86334 IMMUNOFIX E-PHORESIS SERUM: CPT

## 2024-05-04 RX ADMIN — METOPROLOL SUCCINATE 50 MG: 50 TABLET, EXTENDED RELEASE ORAL at 10:15

## 2024-05-04 RX ADMIN — ACETAMINOPHEN 650 MG: 325 TABLET ORAL at 05:30

## 2024-05-04 RX ADMIN — DAKIN'S SOLUTION 0.125% (QUARTER STRENGTH): 0.12 SOLUTION at 10:16

## 2024-05-04 RX ADMIN — LEVOTHYROXINE SODIUM 50 MCG: 0.05 TABLET ORAL at 05:27

## 2024-05-04 RX ADMIN — ACETAMINOPHEN 650 MG: 325 TABLET ORAL at 11:33

## 2024-05-04 RX ADMIN — ACETAMINOPHEN 650 MG: 325 TABLET ORAL at 21:08

## 2024-05-04 RX ADMIN — VANCOMYCIN HYDROCHLORIDE 1000 MG: 1 INJECTION, POWDER, LYOPHILIZED, FOR SOLUTION INTRAVENOUS at 20:27

## 2024-05-04 RX ADMIN — SODIUM CHLORIDE, PRESERVATIVE FREE 10 ML: 5 INJECTION INTRAVENOUS at 10:15

## 2024-05-04 RX ADMIN — SODIUM CHLORIDE, POTASSIUM CHLORIDE, SODIUM LACTATE AND CALCIUM CHLORIDE: 600; 310; 30; 20 INJECTION, SOLUTION INTRAVENOUS at 22:39

## 2024-05-04 RX ADMIN — SODIUM CHLORIDE, PRESERVATIVE FREE 10 ML: 5 INJECTION INTRAVENOUS at 20:26

## 2024-05-04 RX ADMIN — CEFTRIAXONE SODIUM 2000 MG: 2 INJECTION, POWDER, FOR SOLUTION INTRAMUSCULAR; INTRAVENOUS at 17:53

## 2024-05-04 RX ADMIN — SODIUM CHLORIDE, POTASSIUM CHLORIDE, SODIUM LACTATE AND CALCIUM CHLORIDE: 600; 310; 30; 20 INJECTION, SOLUTION INTRAVENOUS at 09:22

## 2024-05-04 ASSESSMENT — PAIN DESCRIPTION - DESCRIPTORS
DESCRIPTORS: ACHING
DESCRIPTORS: ACHING
DESCRIPTORS: ACHING;SORE

## 2024-05-04 ASSESSMENT — PAIN DESCRIPTION - LOCATION
LOCATION: SHOULDER
LOCATION: ARM
LOCATION: SHOULDER

## 2024-05-04 ASSESSMENT — PAIN SCALES - GENERAL
PAINLEVEL_OUTOF10: 3
PAINLEVEL_OUTOF10: 3
PAINLEVEL_OUTOF10: 2
PAINLEVEL_OUTOF10: 3
PAINLEVEL_OUTOF10: 0

## 2024-05-04 ASSESSMENT — PAIN DESCRIPTION - ORIENTATION
ORIENTATION: LEFT

## 2024-05-04 ASSESSMENT — PAIN - FUNCTIONAL ASSESSMENT
PAIN_FUNCTIONAL_ASSESSMENT: PREVENTS OR INTERFERES WITH MANY ACTIVE NOT PASSIVE ACTIVITIES
PAIN_FUNCTIONAL_ASSESSMENT: PREVENTS OR INTERFERES WITH MANY ACTIVE NOT PASSIVE ACTIVITIES

## 2024-05-04 NOTE — PROGRESS NOTES
Patient denies any needs at this time. Q2 turns. Sacral wound care order followed and new dressing applied. No significant changes to wound, odor continues from wound. IV antibiotics infused and tolerated well. IV patent with IV fluids infusing per order. Rojelio has been at the bedside most of the shift. Pt requested niece be called due to her feeling the need to tell her goodbye. Provider made aware of pt's statement. Pt assessed and VSS and no new complaints. Pt was given PRN meds for left arm pain that was relieved after medication and repositioning. Pt has voided 275mls via melara. A small soft BM was noted this morning while pt was getting a bath. Pt took meds po with sips of supplemental drink. Niece was able to encourage pt to eat a few bites of dinner along with drink all of her supplemental drink. Bed is in the low position, locked and call light/personal items are within reach. Hourly rounds performed during shift.

## 2024-05-04 NOTE — PROGRESS NOTES
Hospitalist Progress Note   Admit Date:  2024 11:49 AM   Name:  Brittany Chapin   Age:  89 y.o.  Sex:  female  :  1933   MRN:  [id number]   Room:  603/    Presenting/Chief Complaint: No chief complaint on file.     Reason(s) for Admission: Tachycardia [R00.0]  Elevated brain natriuretic peptide (BNP) level [R79.89]  Sepsis (HCC) [A41.9]  Leukocytosis, unspecified type [D72.829]     Hospital Course:   Please refer to the admission H&P for details of presentation.      In summary, Brittany Chapin is a 89 y.o. female with medical history significant for metastatic breast cancer to cervical spine, paraplegia, hypothyroidism, decubitus ulcers who presents emergency room with abdominal pain and chest pain for the past several days.    Patient  Met criteria for sepsis on admission.  Blood culture with gram-negative rods suspecting Enterobacteriaceae, Proteus.  As well as coag negative Staphylococcus species.  Wound care was consulted for her to DC cubitus wound.  Surgery was consulted as well and not recommending any debridement at this time.  Palliative consulted and patient is made DNR but not interested in hospice at this time.    Subjective/24 hr Events (24) :  Patient is seen and examined at bedside.  No acute events reported overnight by nursing staff.  Patient is lethargic.  Stating that she wants to see her knees as she is \" dying\". does not have a specific complaints.  Denies any fever, chills, chest pain, nausea, vomiting, abdominal pain, sacral pain at this time.  Patient laying in bed appears comfortable.    Patient niece is at bedside with her .  Updated on plan of care.  She states that patient had seen oncology while she was in [place] but had stopped treatment 2 years ago.  Has not seen oncology since she came down here.  Would like to see oncology for treatment options if there are any.      Assessment & Plan:   Sepsis due to sacral wound as well as gram-negative  by provider] enoxaparin (LOVENOX) injection 40 mg  40 mg SubCUTAneous Daily    ondansetron (ZOFRAN-ODT) disintegrating tablet 4 mg  4 mg Oral Q8H PRN    Or    ondansetron (ZOFRAN) injection 4 mg  4 mg IntraVENous Q6H PRN    polyethylene glycol (GLYCOLAX) packet 17 g  17 g Oral Daily PRN    acetaminophen (TYLENOL) tablet 650 mg  650 mg Oral Q6H PRN    Or    acetaminophen (TYLENOL) suppository 650 mg  650 mg Rectal Q6H PRN       Signed:  Jeffrey Burroughs MD    Part of this note may have been written by using a voice dictation software.  The note has been proof read but may still contain some grammatical/other typographical errors.

## 2024-05-04 NOTE — PROGRESS NOTES
CH responded to a consult request for spiritual support. CH reviewed the pt's chart and consulted with the nurse. Upon arrival, the Ch introduced himself to the pt and her family. The pt is from a [employer] [place] background, Christian. The family asked the Ch that they would like to have a  to do last rites and confession. The Ch told them that Francisco Javier Jose Carlos will be informed of the request. They voiced their gratitude and asked for prayer. Ch prayed for the pt and her family.  provided spiritual care and emotional support through pastoral presence, non-anxious presence, active listening, meaningful conversation, and prayer. CH is available as needed.

## 2024-05-04 NOTE — PROGRESS NOTES
VANCO DAILY FOLLOW UP NOTE  [facility]   Pharmacy Pharmacokinetic Monitoring Service - Vancomycin    Consulting Provider: Dr. Lopez   Indication: SSTI/staph epi and proteus bacteremia  Target Concentration: Goal AUC/JAMILA 400-600 mg*hr/L  Day of Therapy: 4 of 7  Additional Antimicrobials: ceftriaxone    Pertinent Laboratory Values:   Wt Readings from Last 1 Encounters:   05/02/24 74.4 kg (164 lb)     Temp Readings from Last 1 Encounters:   05/04/24 98.8 °F (37.1 °C) (Axillary)     Recent Labs     05/01/24  1303 05/01/24  1435 05/01/24  2137 05/02/24  0501 05/03/24  0537 05/03/24  0808 05/04/24  0409   BUN 34*  --   --  33*  --  34* 30*   CREATININE 1.05  --   --  1.06 1.06 1.03 0.94   WBC 15.0*  --   --  16.7*  --  18.0* 14.5*   PROCAL 0.57*  --   --   --   --   --   --    LACTSEPSIS  --  1.7 2.1*  --   --   --   --      Estimated Creatinine Clearance: 41 mL/min (based on SCr of 0.94 mg/dL).    Lab Results   Component Value Date/Time    VANCORANDOM 15.4 05/03/2024 05:37 AM       MRSA Nasal Swab: N/A. Non-respiratory infection    Assessment:  Date/Time Dose Concentration AUC   5/3 0537 1000 mg Q24H 15.4 556   Note: Serum concentrations collected for AUC dosing may appear elevated if collected in close proximity to the dose administered, this is not necessarily an indication of toxicity    Plan:  Dosing recommendations based on Bayesian software  Continue vancomycin 1000 mg every 24 hours as regimen therapeutic.  Renal labs as indicated   Vancomycin concentrations will be ordered as clinically appropriate   Pharmacy will continue to monitor patient and adjust therapy as indicated    Thank you for the consult,  Leonides Joyce [facility]

## 2024-05-04 NOTE — PROGRESS NOTES
Hourly rounds completed.  Uneventful shift, pt rested well.  Turned Q2H.  Bed in low position, wheels locked, call light within reach.

## 2024-05-04 NOTE — PLAN OF CARE
Problem: Discharge Planning  Goal: Discharge to home or other facility with appropriate resources  5/3/2024 2223 by Janessa Doyle RN  Outcome: Progressing  Flowsheets (Taken 5/3/2024 1914)  Discharge to home or other facility with appropriate resources:   Identify barriers to discharge with patient and caregiver   Arrange for needed discharge resources and transportation as appropriate   Identify discharge learning needs (meds, wound care, etc)   Refer to discharge planning if patient needs post-hospital services based on physician order or complex needs related to functional status, cognitive ability or social support system  5/3/2024 1214 by Gee Verdin RN  Outcome: Progressing     Problem: Pain  Goal: Verbalizes/displays adequate comfort level or baseline comfort level  5/3/2024 2223 by Janessa Doyle RN  Outcome: Progressing  Flowsheets (Taken 5/3/2024 1909)  Verbalizes/displays adequate comfort level or baseline comfort level: Encourage patient to monitor pain and request assistance  5/3/2024 1214 by Gee Verdin RN  Outcome: Progressing     Problem: Skin/Tissue Integrity  Goal: Absence of new skin breakdown  Description: 1.  Monitor for areas of redness and/or skin breakdown  2.  Assess vascular access sites hourly  3.  Every 4-6 hours minimum:  Change oxygen saturation probe site  4.  Every 4-6 hours:  If on nasal continuous positive airway pressure, respiratory therapy assess nares and determine need for appliance change or resting period.  5/3/2024 2223 by Janessa Doyle RN  Outcome: Progressing  5/3/2024 1214 by Gee Verdin RN  Outcome: Progressing     Problem: Safety - Adult  Goal: Free from fall injury  5/3/2024 2223 by Janessa Doyle RN  Outcome: Progressing  Flowsheets (Taken 5/3/2024 1914)  Free From Fall Injury: Instruct family/caregiver on patient safety  5/3/2024 1214 by Gee eVrdin RN  Outcome: Progressing     Problem: ABCDS Injury Assessment  Goal: Absence of

## 2024-05-05 PROBLEM — D72.829 LEUKOCYTOSIS: Status: ACTIVE | Noted: 2024-05-05

## 2024-05-05 LAB
ALBUMIN SERPL-MCNC: 1.6 G/DL (ref 3.2–4.6)
ALBUMIN/GLOB SERPL: 0.4 (ref 1–1.9)
ALP SERPL-CCNC: 142 U/L (ref 35–104)
ALT SERPL-CCNC: <5 U/L (ref 12–65)
ANION GAP SERPL CALC-SCNC: 7 MMOL/L (ref 9–18)
AST SERPL-CCNC: 31 U/L (ref 15–37)
BACTERIA SPEC CULT: ABNORMAL
BACTERIA SPEC CULT: ABNORMAL
BASOPHILS # BLD: 0.1 K/UL (ref 0–0.2)
BASOPHILS NFR BLD: 1 % (ref 0–2)
BILIRUB SERPL-MCNC: 0.3 MG/DL (ref 0–1.2)
BUN SERPL-MCNC: 25 MG/DL (ref 8–23)
CALCIUM SERPL-MCNC: 12.5 MG/DL (ref 8.8–10.2)
CHLORIDE SERPL-SCNC: 106 MMOL/L (ref 98–107)
CO2 SERPL-SCNC: 23 MMOL/L (ref 20–28)
CREAT SERPL-MCNC: 0.79 MG/DL (ref 0.6–1.1)
DIFFERENTIAL METHOD BLD: ABNORMAL
EOSINOPHIL # BLD: 0.6 K/UL (ref 0–0.8)
EOSINOPHIL NFR BLD: 4 % (ref 0.5–7.8)
ERYTHROCYTE [DISTWIDTH] IN BLOOD BY AUTOMATED COUNT: 16.6 % (ref 11.9–14.6)
GLOBULIN SER CALC-MCNC: 4.2 G/DL (ref 2.3–3.5)
GLUCOSE SERPL-MCNC: 99 MG/DL (ref 70–99)
GRAM STN SPEC: ABNORMAL
HCT VFR BLD AUTO: 25.4 % (ref 35.8–46.3)
HGB BLD-MCNC: 8 G/DL (ref 11.7–15.4)
IMM GRANULOCYTES # BLD AUTO: 0.3 K/UL (ref 0–0.5)
IMM GRANULOCYTES NFR BLD AUTO: 2 % (ref 0–5)
LYMPHOCYTES # BLD: 2.7 K/UL (ref 0.5–4.6)
LYMPHOCYTES NFR BLD: 18 % (ref 13–44)
MCH RBC QN AUTO: 27.6 PG (ref 26.1–32.9)
MCHC RBC AUTO-ENTMCNC: 31.5 G/DL (ref 31.4–35)
MCV RBC AUTO: 87.6 FL (ref 82–102)
MONOCYTES # BLD: 1.4 K/UL (ref 0.1–1.3)
MONOCYTES NFR BLD: 10 % (ref 4–12)
NEUTS SEG # BLD: 9.7 K/UL (ref 1.7–8.2)
NEUTS SEG NFR BLD: 65 % (ref 43–78)
NRBC # BLD: 0.02 K/UL (ref 0–0.2)
PLATELET # BLD AUTO: 357 K/UL (ref 150–450)
PMV BLD AUTO: 9.6 FL (ref 9.4–12.3)
POTASSIUM SERPL-SCNC: 4.3 MMOL/L (ref 3.5–5.1)
PROT SERPL-MCNC: 5.7 G/DL (ref 6.3–8.2)
RBC # BLD AUTO: 2.9 M/UL (ref 4.05–5.2)
SERVICE CMNT-IMP: ABNORMAL
SODIUM SERPL-SCNC: 136 MMOL/L (ref 136–145)
WBC # BLD AUTO: 14.8 K/UL (ref 4.3–11.1)

## 2024-05-05 PROCEDURE — 82397 CHEMILUMINESCENT ASSAY: CPT

## 2024-05-05 PROCEDURE — 80053 COMPREHEN METABOLIC PANEL: CPT

## 2024-05-05 PROCEDURE — 6370000000 HC RX 637 (ALT 250 FOR IP): Performed by: STUDENT IN AN ORGANIZED HEALTH CARE EDUCATION/TRAINING PROGRAM

## 2024-05-05 PROCEDURE — 85025 COMPLETE CBC W/AUTO DIFF WBC: CPT

## 2024-05-05 PROCEDURE — 36415 COLL VENOUS BLD VENIPUNCTURE: CPT

## 2024-05-05 PROCEDURE — 51702 INSERT TEMP BLADDER CATH: CPT

## 2024-05-05 PROCEDURE — 6360000002 HC RX W HCPCS: Performed by: INTERNAL MEDICINE

## 2024-05-05 PROCEDURE — 2580000003 HC RX 258: Performed by: INTERNAL MEDICINE

## 2024-05-05 PROCEDURE — 1100000000 HC RM PRIVATE

## 2024-05-05 PROCEDURE — 2580000003 HC RX 258: Performed by: STUDENT IN AN ORGANIZED HEALTH CARE EDUCATION/TRAINING PROGRAM

## 2024-05-05 PROCEDURE — 99222 1ST HOSP IP/OBS MODERATE 55: CPT | Performed by: INTERNAL MEDICINE

## 2024-05-05 RX ORDER — SODIUM CHLORIDE 9 MG/ML
INJECTION, SOLUTION INTRAVENOUS CONTINUOUS
Status: DISCONTINUED | OUTPATIENT
Start: 2024-05-05 | End: 2024-05-09

## 2024-05-05 RX ORDER — FERROUS SULFATE 325(65) MG
325 TABLET ORAL
Status: DISCONTINUED | OUTPATIENT
Start: 2024-05-06 | End: 2024-05-10 | Stop reason: HOSPADM

## 2024-05-05 RX ADMIN — ACETAMINOPHEN 650 MG: 325 TABLET ORAL at 20:38

## 2024-05-05 RX ADMIN — SODIUM CHLORIDE, PRESERVATIVE FREE 10 ML: 5 INJECTION INTRAVENOUS at 09:09

## 2024-05-05 RX ADMIN — SODIUM CHLORIDE: 9 INJECTION, SOLUTION INTRAVENOUS at 23:10

## 2024-05-05 RX ADMIN — SODIUM CHLORIDE: 9 INJECTION, SOLUTION INTRAVENOUS at 12:01

## 2024-05-05 RX ADMIN — ACETAMINOPHEN 650 MG: 325 TABLET ORAL at 05:14

## 2024-05-05 RX ADMIN — ONDANSETRON 4 MG: 4 TABLET, ORALLY DISINTEGRATING ORAL at 17:45

## 2024-05-05 RX ADMIN — DAKIN'S SOLUTION 0.125% (QUARTER STRENGTH): 0.12 SOLUTION at 09:08

## 2024-05-05 RX ADMIN — LEVOTHYROXINE SODIUM 50 MCG: 0.05 TABLET ORAL at 05:12

## 2024-05-05 RX ADMIN — CEFTRIAXONE SODIUM 2000 MG: 2 INJECTION, POWDER, FOR SOLUTION INTRAMUSCULAR; INTRAVENOUS at 17:41

## 2024-05-05 RX ADMIN — VANCOMYCIN HYDROCHLORIDE 1000 MG: 1 INJECTION, POWDER, LYOPHILIZED, FOR SOLUTION INTRAVENOUS at 20:38

## 2024-05-05 RX ADMIN — SODIUM CHLORIDE, PRESERVATIVE FREE 10 ML: 5 INJECTION INTRAVENOUS at 21:39

## 2024-05-05 RX ADMIN — METOPROLOL SUCCINATE 50 MG: 50 TABLET, EXTENDED RELEASE ORAL at 09:07

## 2024-05-05 ASSESSMENT — PAIN SCALES - GENERAL
PAINLEVEL_OUTOF10: 2
PAINLEVEL_OUTOF10: 3
PAINLEVEL_OUTOF10: 3
PAINLEVEL_OUTOF10: 0

## 2024-05-05 ASSESSMENT — PAIN DESCRIPTION - LOCATION
LOCATION: SHOULDER
LOCATION: SHOULDER

## 2024-05-05 ASSESSMENT — PAIN DESCRIPTION - DESCRIPTORS
DESCRIPTORS: ACHING;SORE
DESCRIPTORS: ACHING;SORE

## 2024-05-05 ASSESSMENT — PAIN DESCRIPTION - ORIENTATION
ORIENTATION: LEFT
ORIENTATION: LEFT

## 2024-05-05 ASSESSMENT — PAIN - FUNCTIONAL ASSESSMENT
PAIN_FUNCTIONAL_ASSESSMENT: ACTIVITIES ARE NOT PREVENTED
PAIN_FUNCTIONAL_ASSESSMENT: PREVENTS OR INTERFERES WITH MANY ACTIVE NOT PASSIVE ACTIVITIES

## 2024-05-05 NOTE — PROGRESS NOTES
VANCO DAILY FOLLOW UP NOTE  [facility]   Pharmacy Pharmacokinetic Monitoring Service - Vancomycin    Consulting Provider: Dr. Lopez   Indication: SSTI/staph epi and proteus bacteremia  Target Concentration: Goal AUC/JAMILA 400-600 mg*hr/L  Day of Therapy: 5 of 7  Additional Antimicrobials: ceftriaxone    Pertinent Laboratory Values:   Wt Readings from Last 1 Encounters:   05/02/24 74.4 kg (164 lb)     Temp Readings from Last 1 Encounters:   05/05/24 98.4 °F (36.9 °C) (Oral)     Recent Labs     05/03/24  0808 05/04/24  0409 05/05/24  0537   BUN 34* 30* 25*   CREATININE 1.03 0.94 0.79   WBC 18.0* 14.5* 14.8*     Estimated Creatinine Clearance: 49 mL/min (based on SCr of 0.79 mg/dL).    Lab Results   Component Value Date/Time    VANCORANDOM 15.4 05/03/2024 05:37 AM       MRSA Nasal Swab: N/A. Non-respiratory infection    Assessment:  Date/Time Dose Concentration AUC   5/3 0537 1000 mg q24h 15.4 556   Note: Serum concentrations collected for AUC dosing may appear elevated if collected in close proximity to the dose administered, this is not necessarily an indication of toxicity    Plan:  Dosing recommendations based on Bayesian software  Continue vancomycin 1000 mg every 24 hours - AUC is therapeutic  Renal labs as indicated   Vancomycin concentrations will be ordered as clinically appropriate   Pharmacy will continue to monitor patient and adjust therapy as indicated    Thank you for the consult,  Jovita Hilliard [facility]

## 2024-05-05 NOTE — PLAN OF CARE
Problem: Discharge Planning  Goal: Discharge to home or other facility with appropriate resources  5/4/2024 2003 by Janessa Doyle RN  Outcome: Progressing  Flowsheets (Taken 5/4/2024 1902)  Discharge to home or other facility with appropriate resources:   Identify barriers to discharge with patient and caregiver   Arrange for needed discharge resources and transportation as appropriate   Identify discharge learning needs (meds, wound care, etc)   Refer to discharge planning if patient needs post-hospital services based on physician order or complex needs related to functional status, cognitive ability or social support system  5/4/2024 1021 by Gee Verdin RN  Outcome: Progressing     Problem: Pain  Goal: Verbalizes/displays adequate comfort level or baseline comfort level  5/4/2024 2003 by Janessa Doyle RN  Outcome: Progressing  Flowsheets (Taken 5/4/2024 1903)  Verbalizes/displays adequate comfort level or baseline comfort level: Encourage patient to monitor pain and request assistance  5/4/2024 1021 by Gee Verdin RN  Outcome: Progressing     Problem: Skin/Tissue Integrity  Goal: Absence of new skin breakdown  Description: 1.  Monitor for areas of redness and/or skin breakdown  2.  Assess vascular access sites hourly  3.  Every 4-6 hours minimum:  Change oxygen saturation probe site  4.  Every 4-6 hours:  If on nasal continuous positive airway pressure, respiratory therapy assess nares and determine need for appliance change or resting period.  5/4/2024 2003 by Janessa Doyle, RN  Outcome: Progressing  5/4/2024 1021 by Gee Verdin RN  Outcome: Progressing     Problem: Safety - Adult  Goal: Free from fall injury  5/4/2024 2003 by Janessa Doyle RN  Outcome: Progressing  Flowsheets (Taken 5/4/2024 1903)  Free From Fall Injury: Instruct family/caregiver on patient safety  5/4/2024 1021 by Gee Verdin RN  Outcome: Progressing     Problem: ABCDS Injury Assessment  Goal: Absence of  physical injury  5/4/2024 2003 by Janessa Doyle, RN  Outcome: Progressing  Flowsheets (Taken 5/4/2024 1903)  Absence of Physical Injury: Implement safety measures based on patient assessment  5/4/2024 1021 by Gee Verdin RN  Outcome: Progressing     Problem: Nutrition Deficit:  Goal: Optimize nutritional status  5/4/2024 2003 by Janessa Doyle RN  Outcome: Progressing  5/4/2024 1118 by Gee Verdin RN  Outcome: Not Progressing     Problem: Nutrition Deficit:  Goal: Optimize nutritional status  5/4/2024 2003 by Janessa Doyle, RN  Outcome: Progressing  5/4/2024 1118 by Gee Verdin RN  Outcome: Not Progressing

## 2024-05-05 NOTE — PROGRESS NOTES
Reticulocytes    Collection Time: 05/04/24  1:29 PM   Result Value Ref Range    Reticulocyte Count,Automated 1.9 0.3 - 2.0 %    Absolute Retic # 0.0572 0.026 - 0.095 M/ul    Immature Retic Fraction 33.3 (H) 3.0 - 15.9 %    Retic Hemoglobin conc. 25 (L) 29 - 35 pg   Lactate Dehydrogenase    Collection Time: 05/04/24  1:29 PM   Result Value Ref Range     127 - 281 U/L   Vitamin B12    Collection Time: 05/04/24  1:29 PM   Result Value Ref Range    Vitamin B-12 3018 (H) 193 - 986 pg/mL   Path Review, Smear    Collection Time: 05/04/24  1:29 PM   Result Value Ref Range    Pathologist Review PENDING    Comprehensive Metabolic Panel w/ Reflex to MG    Collection Time: 05/05/24  5:37 AM   Result Value Ref Range    Sodium 136 136 - 145 mmol/L    Potassium 4.3 3.5 - 5.1 mmol/L    Chloride 106 98 - 107 mmol/L    CO2 23 20 - 28 mmol/L    Anion Gap 7 (L) 9 - 18 mmol/L    Glucose 99 70 - 99 mg/dL    BUN 25 (H) 8 - 23 MG/DL    Creatinine 0.79 0.60 - 1.10 MG/DL    Est, Glom Filt Rate 72 >60 ml/min/1.73m2    Calcium 12.5 (H) 8.8 - 10.2 MG/DL    Total Bilirubin 0.3 0.0 - 1.2 MG/DL    ALT <5 (L) 12 - 65 U/L    AST 31 15 - 37 U/L    Alk Phosphatase 142 (H) 35 - 104 U/L    Total Protein 5.7 (L) 6.3 - 8.2 g/dL    Albumin 1.6 (L) 3.2 - 4.6 g/dL    Globulin 4.2 (H) 2.3 - 3.5 g/dL    Albumin/Globulin Ratio 0.4 (L) 1.0 - 1.9     CBC with Auto Differential    Collection Time: 05/05/24  5:37 AM   Result Value Ref Range    WBC 14.8 (H) 4.3 - 11.1 K/uL    RBC 2.90 (L) 4.05 - 5.2 M/uL    Hemoglobin 8.0 (L) 11.7 - 15.4 g/dL    Hematocrit 25.4 (L) 35.8 - 46.3 %    MCV 87.6 82 - 102 FL    MCH 27.6 26.1 - 32.9 PG    MCHC 31.5 31.4 - 35.0 g/dL    RDW 16.6 (H) 11.9 - 14.6 %    Platelets 357 150 - 450 K/uL    MPV 9.6 9.4 - 12.3 FL    nRBC 0.02 0.0 - 0.2 K/uL    Differential Type AUTOMATED      Neutrophils % 65 43 - 78 %    Lymphocytes % 18 13 - 44 %    Monocytes % 10 4.0 - 12.0 %    Eosinophils % 4 0.5 - 7.8 %    Basophils % 1 0.0 - 2.0 %     this note may have been written by using a voice dictation software.  The note has been proof read but may still contain some grammatical/other typographical errors.

## 2024-05-05 NOTE — CONSULTS
[facility] Hematology & Oncology        Inpatient Hematology / Oncology Consult    Reason for Consult:  Tachycardia [R00.0]  Elevated brain natriuretic peptide (BNP) level [R79.89]  Sepsis (HCC) [A41.9]  Leukocytosis, unspecified type [D72.829]  Referring Physician:  Jeffrey Burroughs MD    History of Present Illness:  Ms. Chapin is a 89 y.o. female admitted on 5/1/2024. The primary encounter diagnosis was Leukocytosis, unspecified type. Diagnoses of Elevated brain natriuretic peptide (BNP) level and Tachycardia were also pertinent to this visit..      Ms. Chapin is a 89 year old female admitted on 5/1/2024 with leukocytosis/sepsis.  She has a PMH of  metastatic breast cancer to cervical spine, paraplegia, hypothyroidism, decubitus ulcers.  She was previously treated in [place] for metastatic breast cancer, stopped tx about 2 years ago.  Records not available at this time for review.  She presented to the ER with abdominal and chest pain x several days.  She met sepsis criteria on admission, Bcx + for gram negative rods (enterobacter, proteus, coag neg staph), felt secondary to sacral wound.  Wound care is following.  PC evaluated pt - agreed to DNR but not interested in Hospice at this time.  Patient and niece are interested in pursuing further tx for metastatic breast ca, we are consulted for our recommendations.      Review of Systems:  Constitutional +fatigue.  Denies fever, chills   HEENT Denies trauma, blurry vision, hearing loss, ear pain, nosebleeds, sore throat, neck pain and ear discharge.    Skin Denies lesions or rashes.   Lungs Denies dyspnea, cough, sputum production or hemoptysis.   Cardiovascular Denies chest pain, palpitations, or lower extremity edema.   Gastrointestinal Denies nausea, vomiting, changes in bowel habits, bloody or black stools, abdominal pain.    Denies dysuria, frequency or hesitancy of urination.   Neuro Denies headaches, visual changes or ataxia. Denies dizziness, tingling, tremors,  there are any questions about this report, I can be reached on [employer]  or at [phone]      ASSESSMENT:  Patient Active Problem List   Diagnosis    Sepsis (HCC)    Urinary retention    Hypercalcemia    Paraplegia (HCC)    Breast cancer metastasized to bone (HCC)    Hypertension    Anemia    Hypothyroidism    Decubitus ulcer of sacral region, stage 3 (HCC)    Acute blood loss anemia    Gram-negative bacteremia       RECOMMENDATIONS:  Met Breast Ca (spine)  - previously treated with chemo in [place], has not had tx in about 2 years  - unable to see these records  - pt interested in potential tx, PC evaluated and not interested in Hospice at this time but did agree to DNR  - will need to obtain records from [place] and will need restaging, perhaps PET scan as OP.  CT scan of A/P with no acute findings     Anemia  - Ferritin elevated as well as other inflammatory markers - CRP and sed rate  - B12 adequate as well as Vit D  - LD WNL, Awaiting Hapto, smear and SPEP/FLC     Sepsis r/t sacral wound and gram negative bacteremia  - ID following, currently on Rocephin and Vanc    Hypercalcemia  - Hospitalist following and on IVFs  - Consider Zometa     Goals and plan of care reviewed with the patient.  All questions answered to the best of our ability.  Lab studies and imaging studies were personally reviewed.  Thank you for allowing us to participate in the care of Ms. Chapin.         Facundo Joy MD

## 2024-05-05 NOTE — PROGRESS NOTES
Hourly rounds completed.  Pain managed per MAR overnight.  Pt turned Q2H.  No BM this shift.  Sacral dressing intact.  Bed in low position, wheels locked, call light within reach.

## 2024-05-06 LAB
ACCESSION NUMBER, LLC1M: ABNORMAL
ACINETOBACTER CALCOAC BAUMANNII COMPLEX BY PCR: NOT DETECTED
ALBUMIN SERPL-MCNC: 1.6 G/DL (ref 3.2–4.6)
ALBUMIN/GLOB SERPL: 0.4 (ref 1–1.9)
ALP SERPL-CCNC: 135 U/L (ref 35–104)
ALT SERPL-CCNC: <5 U/L (ref 12–65)
ANION GAP SERPL CALC-SCNC: 5 MMOL/L (ref 9–18)
AST SERPL-CCNC: 30 U/L (ref 15–37)
B FRAGILIS DNA BLD POS QL NAA+NON-PROBE: NOT DETECTED
BASOPHILS # BLD: 0.1 K/UL (ref 0–0.2)
BASOPHILS NFR BLD: 0 % (ref 0–2)
BILIRUB SERPL-MCNC: 0.2 MG/DL (ref 0–1.2)
BIOFIRE TEST COMMENT: ABNORMAL
BLACTX-M ISLT/SPM QL: NOT DETECTED
BLAIMP ISLT/SPM QL: NOT DETECTED
BLAKPC BLD POS QL NAA+NON-PROBE: NOT DETECTED
BLAOXA-48-LIKE ISLT/SPM QL: NOT DETECTED
BLAVIM ISLT/SPM QL: NOT DETECTED
BUN SERPL-MCNC: 23 MG/DL (ref 8–23)
C ALBICANS DNA BLD POS QL NAA+NON-PROBE: NOT DETECTED
C AURIS DNA BLD POS QL NAA+NON-PROBE: NOT DETECTED
C GATTII+NEOFOR DNA BLD POS QL NAA+N-PRB: NOT DETECTED
C GLABRATA DNA BLD POS QL NAA+NON-PROBE: NOT DETECTED
C KRUSEI DNA BLD POS QL NAA+NON-PROBE: NOT DETECTED
C PARAP DNA BLD POS QL NAA+NON-PROBE: NOT DETECTED
C TROPICLS DNA BLD POS QL NAA+NON-PROBE: NOT DETECTED
CALCIUM SERPL-MCNC: 12.4 MG/DL (ref 8.8–10.2)
CHLORIDE SERPL-SCNC: 107 MMOL/L (ref 98–107)
CO2 SERPL-SCNC: 24 MMOL/L (ref 20–28)
CREAT SERPL-MCNC: 0.71 MG/DL (ref 0.6–1.1)
DIFFERENTIAL METHOD BLD: ABNORMAL
E CLOAC COMP DNA BLD POS NAA+NON-PROBE: NOT DETECTED
E COLI DNA BLD POS QL NAA+NON-PROBE: NOT DETECTED
E FAECALIS DNA BLD POS QL NAA+NON-PROBE: NOT DETECTED
E FAECIUM DNA BLD POS QL NAA+NON-PROBE: NOT DETECTED
ENTEROBACTERALES DNA BLD POS NAA+N-PRB: DETECTED
EOSINOPHIL # BLD: 0.5 K/UL (ref 0–0.8)
EOSINOPHIL NFR BLD: 4 % (ref 0.5–7.8)
ERYTHROCYTE [DISTWIDTH] IN BLOOD BY AUTOMATED COUNT: 16.4 % (ref 11.9–14.6)
GLOBULIN SER CALC-MCNC: 3.9 G/DL (ref 2.3–3.5)
GLUCOSE SERPL-MCNC: 102 MG/DL (ref 70–99)
GP B STREP DNA BLD POS QL NAA+NON-PROBE: NOT DETECTED
HAEM INFLU DNA BLD POS QL NAA+NON-PROBE: NOT DETECTED
HAPTOGLOB SERPL-MCNC: 248 MG/DL (ref 30–200)
HCT VFR BLD AUTO: 24.8 % (ref 35.8–46.3)
HGB BLD-MCNC: 7.7 G/DL (ref 11.7–15.4)
IMM GRANULOCYTES # BLD AUTO: 0.3 K/UL (ref 0–0.5)
IMM GRANULOCYTES NFR BLD AUTO: 2 % (ref 0–5)
K OXYTOCA DNA BLD POS QL NAA+NON-PROBE: NOT DETECTED
KLEBSIELLA SP DNA BLD POS QL NAA+NON-PRB: NOT DETECTED
KLEBSIELLA SP DNA BLD POS QL NAA+NON-PRB: NOT DETECTED
L MONOCYTOG DNA BLD POS QL NAA+NON-PROBE: NOT DETECTED
LYMPHOCYTES # BLD: 2.5 K/UL (ref 0.5–4.6)
LYMPHOCYTES NFR BLD: 19 % (ref 13–44)
MCH RBC QN AUTO: 27.1 PG (ref 26.1–32.9)
MCHC RBC AUTO-ENTMCNC: 31 G/DL (ref 31.4–35)
MCV RBC AUTO: 87.3 FL (ref 82–102)
MONOCYTES # BLD: 1.5 K/UL (ref 0.1–1.3)
MONOCYTES NFR BLD: 11 % (ref 4–12)
N MEN DNA BLD POS QL NAA+NON-PROBE: NOT DETECTED
NEUTS SEG # BLD: 8.6 K/UL (ref 1.7–8.2)
NEUTS SEG NFR BLD: 64 % (ref 43–78)
NRBC # BLD: 0.02 K/UL (ref 0–0.2)
P AERUGINOSA DNA BLD POS NAA+NON-PROBE: NOT DETECTED
PATH REV BLD -IMP: NORMAL
PLATELET # BLD AUTO: 347 K/UL (ref 150–450)
PMV BLD AUTO: 9.5 FL (ref 9.4–12.3)
POTASSIUM SERPL-SCNC: 4.1 MMOL/L (ref 3.5–5.1)
PROT SERPL-MCNC: 5.4 G/DL (ref 6.3–8.2)
PROTEUS SP DNA BLD POS QL NAA+NON-PROBE: DETECTED
RBC # BLD AUTO: 2.84 M/UL (ref 4.05–5.2)
RESISTANT GENE NDM BY PCR: NOT DETECTED
RESISTANT GENE TARGETS: ABNORMAL
S AUREUS DNA BLD POS QL NAA+NON-PROBE: NOT DETECTED
S AUREUS+CONS DNA BLD POS NAA+NON-PROBE: NOT DETECTED
S EPIDERMIDIS DNA BLD POS QL NAA+NON-PRB: NOT DETECTED
S LUGDUNENSIS DNA BLD POS QL NAA+NON-PRB: NOT DETECTED
S MALTOPHILIA DNA BLD POS QL NAA+NON-PRB: NOT DETECTED
S MARCESCENS DNA BLD POS NAA+NON-PROBE: NOT DETECTED
S PNEUM DNA BLD POS QL NAA+NON-PROBE: NOT DETECTED
S PYO DNA BLD POS QL NAA+NON-PROBE: NOT DETECTED
SALMONELLA DNA BLD POS QL NAA+NON-PROBE: NOT DETECTED
SODIUM SERPL-SCNC: 135 MMOL/L (ref 136–145)
STREPTOCOCCUS DNA BLD POS NAA+NON-PROBE: NOT DETECTED
WBC # BLD AUTO: 13.4 K/UL (ref 4.3–11.1)

## 2024-05-06 PROCEDURE — 6360000002 HC RX W HCPCS: Performed by: INTERNAL MEDICINE

## 2024-05-06 PROCEDURE — 6360000002 HC RX W HCPCS: Performed by: STUDENT IN AN ORGANIZED HEALTH CARE EDUCATION/TRAINING PROGRAM

## 2024-05-06 PROCEDURE — 99222 1ST HOSP IP/OBS MODERATE 55: CPT | Performed by: INTERNAL MEDICINE

## 2024-05-06 PROCEDURE — 2580000003 HC RX 258: Performed by: INTERNAL MEDICINE

## 2024-05-06 PROCEDURE — 85025 COMPLETE CBC W/AUTO DIFF WBC: CPT

## 2024-05-06 PROCEDURE — 6370000000 HC RX 637 (ALT 250 FOR IP): Performed by: STUDENT IN AN ORGANIZED HEALTH CARE EDUCATION/TRAINING PROGRAM

## 2024-05-06 PROCEDURE — 6370000000 HC RX 637 (ALT 250 FOR IP): Performed by: INTERNAL MEDICINE

## 2024-05-06 PROCEDURE — 36415 COLL VENOUS BLD VENIPUNCTURE: CPT

## 2024-05-06 PROCEDURE — 80053 COMPREHEN METABOLIC PANEL: CPT

## 2024-05-06 PROCEDURE — 2580000003 HC RX 258: Performed by: STUDENT IN AN ORGANIZED HEALTH CARE EDUCATION/TRAINING PROGRAM

## 2024-05-06 PROCEDURE — 1100000000 HC RM PRIVATE

## 2024-05-06 RX ORDER — ZOLEDRONIC ACID 0.04 MG/ML
4 INJECTION, SOLUTION INTRAVENOUS ONCE
Status: COMPLETED | OUTPATIENT
Start: 2024-05-06 | End: 2024-05-06

## 2024-05-06 RX ADMIN — LEVOTHYROXINE SODIUM 50 MCG: 0.05 TABLET ORAL at 05:47

## 2024-05-06 RX ADMIN — SODIUM CHLORIDE, PRESERVATIVE FREE 10 ML: 5 INJECTION INTRAVENOUS at 09:14

## 2024-05-06 RX ADMIN — METOPROLOL SUCCINATE 50 MG: 50 TABLET, EXTENDED RELEASE ORAL at 09:15

## 2024-05-06 RX ADMIN — ONDANSETRON 4 MG: 2 INJECTION INTRAMUSCULAR; INTRAVENOUS at 12:10

## 2024-05-06 RX ADMIN — DAKIN'S SOLUTION 0.125% (QUARTER STRENGTH): 0.12 SOLUTION at 09:15

## 2024-05-06 RX ADMIN — CEFTRIAXONE SODIUM 2000 MG: 2 INJECTION, POWDER, FOR SOLUTION INTRAMUSCULAR; INTRAVENOUS at 17:44

## 2024-05-06 RX ADMIN — FERROUS SULFATE TAB 325 MG (65 MG ELEMENTAL FE) 325 MG: 325 (65 FE) TAB at 09:15

## 2024-05-06 RX ADMIN — ACETAMINOPHEN 650 MG: 325 TABLET ORAL at 19:50

## 2024-05-06 RX ADMIN — VANCOMYCIN HYDROCHLORIDE 1000 MG: 1 INJECTION, POWDER, LYOPHILIZED, FOR SOLUTION INTRAVENOUS at 19:48

## 2024-05-06 RX ADMIN — ACETAMINOPHEN 650 MG: 325 TABLET ORAL at 05:47

## 2024-05-06 RX ADMIN — SODIUM CHLORIDE: 9 INJECTION, SOLUTION INTRAVENOUS at 09:14

## 2024-05-06 RX ADMIN — ZOLEDRONIC ACID 4 MG: 0.04 INJECTION, SOLUTION INTRAVENOUS at 12:14

## 2024-05-06 ASSESSMENT — PAIN SCALES - GENERAL
PAINLEVEL_OUTOF10: 0
PAINLEVEL_OUTOF10: 3
PAINLEVEL_OUTOF10: 2
PAINLEVEL_OUTOF10: 0
PAINLEVEL_OUTOF10: 0

## 2024-05-06 ASSESSMENT — PAIN DESCRIPTION - LOCATION
LOCATION: SHOULDER
LOCATION: GENERALIZED

## 2024-05-06 ASSESSMENT — PAIN - FUNCTIONAL ASSESSMENT: PAIN_FUNCTIONAL_ASSESSMENT: ACTIVITIES ARE NOT PREVENTED

## 2024-05-06 ASSESSMENT — PAIN DESCRIPTION - DESCRIPTORS
DESCRIPTORS: ACHING;SORE
DESCRIPTORS: ACHING

## 2024-05-06 ASSESSMENT — PAIN SCALES - WONG BAKER: WONGBAKER_NUMERICALRESPONSE: NO HURT

## 2024-05-06 ASSESSMENT — PAIN DESCRIPTION - ORIENTATION: ORIENTATION: LEFT

## 2024-05-06 NOTE — PROGRESS NOTES
Pt resting, denies needs at this time.  IVF infusing as ordered.  Wound care completed.  [place] care complete.  Hourly rounds completed.  Bed locked and lowered into lowest position.  Call light and personal items within reach.  Report given to night nurse.

## 2024-05-06 NOTE — CARE COORDINATION
Per IDR this morning, pt may be ready for discharge Wed/Thurs of this week. Continues to be treated medically. CM will continue to follow for dc needs/plan.

## 2024-05-06 NOTE — PROGRESS NOTES
Hospitalist Progress Note   Admit Date:  2024 11:49 AM   Name:  Brittany Chapin   Age:  89 y.o.  Sex:  female  :  1933   MRN:  [id number]   Room:  603/    Presenting/Chief Complaint: No chief complaint on file.     Reason(s) for Admission: Tachycardia [R00.0]  Elevated brain natriuretic peptide (BNP) level [R79.89]  Sepsis (HCC) [A41.9]  Leukocytosis, unspecified type [D72.829]     Hospital Course:   Please refer to the admission H&P for details of presentation.      In summary, Brittany Chapin is a 89 y.o. female with medical history significant for metastatic breast cancer to cervical spine, paraplegia, hypothyroidism, decubitus ulcers who presents emergency room with abdominal pain and chest pain for the past several days.    Patient  Met criteria for sepsis on admission.  Blood culture with gram-negative rods suspecting Enterobacteriaceae, Proteus.  As well as coag negative Staphylococcus species.  Wound care was consulted for her to DC cubitus wound.  Surgery was consulted as well and not recommending any debridement at this time.  Palliative consulted and patient is made DNR but not interested in hospice at this time.    Subjective/24 hr Events (24) :  Patient is seen and examined at bedside.  No acute events reported overnight by nursing staff.  Leti is at bedside.    Patient sitting up in her bed.  Alert awake.  Patient is more alert today and interactive.  Reports that she is feeling slightly better today.  Complaining of sacral pain as well as left breast/shoulder pain.  Niece and patient has spoken with oncology yesterday.  Leti brought in papers from [place] from her prior oncology treatments.    Patient denies any fever, chills, chest pain, nausea, vomiting, abdominal pain.    Discussed with leti at bedside.    Assessment & Plan:   Sepsis due to sacral wound as well as gram-negative bacteremia(Proteus/Enterobacteriaceae and staph coag neg)  -Continue with IV Rocephin and

## 2024-05-06 NOTE — PROGRESS NOTES
[facility] Hematology & Oncology        Inpatient Hematology / Oncology Progress Note    Reason for Consult:  Tachycardia [R00.0]  Elevated brain natriuretic peptide (BNP) level [R79.89]  Sepsis (HCC) [A41.9]  Leukocytosis, unspecified type [D72.829]  Referring Physician:  Jeffrey Burroughs MD    24 Hour Events:  VSS, afebrile  On CTX and Vanc (Proteus / S epidermidis on BC)  Wound care following  Hgb fairly stable at 7.7  Zometa for hypercalcemia  Family at bedside      ROS:  RICHARD    10 point review of systems is otherwise negative with the exception of the elements mentioned above in the HPI.         No Known Allergies  No past medical history on file.  No past surgical history on file.  No family history on file.  Social History     Socioeconomic History    Marital status:      Spouse name: Not on file    Number of children: Not on file    Years of education: Not on file    Highest education level: Not on file   Occupational History    Not on file   Tobacco Use    Smoking status: Never    Smokeless tobacco: Never   Substance and Sexual Activity    Alcohol use: Not on file    Drug use: Not on file    Sexual activity: Not on file   Other Topics Concern    Not on file   Social History Narrative    Not on file     Social Determinants of Health     Financial Resource Strain: Not on file   Food Insecurity: No Food Insecurity (5/1/2024)    Hunger Vital Sign     Worried About Running Out of Food in the Last Year: Never true     Ran Out of Food in the Last Year: Never true   Transportation Needs: No Transportation Needs (5/1/2024)    PRAPARE - Transportation     Lack of Transportation (Medical): No     Lack of Transportation (Non-Medical): No   Physical Activity: Not on file   Stress: Not on file   Social Connections: Not on file   Intimate Partner Violence: Not on file   Housing Stability: Low Risk  (5/1/2024)    Housing Stability Vital Sign     Unable to Pay for Housing in the Last Year: No     Number of Places

## 2024-05-06 NOTE — PROGRESS NOTES
Infectious Disease Progress Note    Today's Date: 2024   Admit Date: 2024  : 1933    Impression:   Proteus bacteremia +24. Pan-S. Repeat blood cx 5/3 no growth to date  S epidermidis bacteremia +24   Metastatic breast CA with resulting paraplegia  Unstageable sacral ulcer    Plan:   Continue IV vancomycin and IV ceftriaxone 2 G q 24 hours for 7 days with EOT 24 for coag negative staph and proteus bacteremia  ID will sign off at this time with plan noted above, please reach out with further questions or concerns     Anti-infectives:   Vancomycin (2024 - )  Cefepime (2024 - )    Subjective:   Overnight events: 13.4. Afebrile. Patient resting in bed. Had episode of vomiting after she ate lunch. Aside from this she is feeling better and has no new concerns    Patient is a 89 y.o. female with a history of metastatic breast cancer to cervical spine causing paraplegia.  I have limited history.  She is from [place] and was just recently brought to [place] by her niece who lives in [place].  She presented to the ED on 2024 with abdominal pain and chest pain over several days.  She also has a sacral wound.  CT abdomen/pelvis was unremarkable.  She was started on ceftriaxone in the ED.  Blood cultures are growing GNR from 1 anaerobic bottle from peripheral site and GPC from a separate peripheral site.  PCR has identified S epidermidis.  Another PCR has identified proteus (this is labeled on the date of 2024, but it is my impression that it is from 2024). She was started on cefepime and vancomycin in the ED.  General surgery has evaluated the sacral wound and recommended no debridement.  She has been afebrile since admission.  WBC has trended up to 18k.  She is able to give some history, but is not able to provide a high level of detail.  From what I can gather, it sounds like she has received radiation to her spine, but declined chemotherapy.         No past medical history on

## 2024-05-06 NOTE — PLAN OF CARE
Problem: Discharge Planning  Goal: Discharge to home or other facility with appropriate resources  Outcome: Progressing     Problem: Pain  Goal: Verbalizes/displays adequate comfort level or baseline comfort level  Outcome: Progressing  Flowsheets (Taken 5/5/2024 1918)  Verbalizes/displays adequate comfort level or baseline comfort level: Encourage patient to monitor pain and request assistance     Problem: Skin/Tissue Integrity  Goal: Absence of new skin breakdown  Description: 1.  Monitor for areas of redness and/or skin breakdown  2.  Assess vascular access sites hourly  3.  Every 4-6 hours minimum:  Change oxygen saturation probe site  4.  Every 4-6 hours:  If on nasal continuous positive airway pressure, respiratory therapy assess nares and determine need for appliance change or resting period.  Outcome: Progressing     Problem: Safety - Adult  Goal: Free from fall injury  Outcome: Progressing  Flowsheets (Taken 5/5/2024 1909)  Free From Fall Injury: Instruct family/caregiver on patient safety     Problem: ABCDS Injury Assessment  Goal: Absence of physical injury  Outcome: Progressing  Flowsheets (Taken 5/5/2024 1909)  Absence of Physical Injury: Implement safety measures based on patient assessment     Problem: Nutrition Deficit:  Goal: Optimize nutritional status  Outcome: Progressing

## 2024-05-06 NOTE — PROGRESS NOTES
Hourly rounds completed.  Pt had uneventful shift, rested well.  Turned Q2H.  Pain managed per MAR.  Sacral/heel dressings changed this AM.  Bed in low position, wheels locked, call light within reach.

## 2024-05-06 NOTE — PROGRESS NOTES
VANCO DAILY FOLLOW UP NOTE  [facility]   Pharmacy Pharmacokinetic Monitoring Service - Vancomycin    Consulting Provider: Dr. Lopez   Indication: SSTI/staph epi and proteus bacteremia  Target Concentration: Goal AUC/JAMILA 400-600 mg*hr/L  Day of Therapy: 6 of 7  Additional Antimicrobials: ceftriaxone    Pertinent Laboratory Values:   Wt Readings from Last 1 Encounters:   05/02/24 74.4 kg (164 lb)     Temp Readings from Last 1 Encounters:   05/06/24 98.6 °F (37 °C)     Recent Labs     05/04/24  0409 05/05/24  0537 05/06/24  0343   BUN 30* 25* 23   CREATININE 0.94 0.79 0.71   WBC 14.5* 14.8* 13.4*     Estimated Creatinine Clearance: 54 mL/min (based on SCr of 0.71 mg/dL).    Lab Results   Component Value Date/Time    VANCORANDOM 15.4 05/03/2024 05:37 AM       MRSA Nasal Swab: N/A. Non-respiratory infection    Assessment:  Date/Time Dose Concentration AUC   5/3 0537 1000 mg q24h 15.4 556   Note: Serum concentrations collected for AUC dosing may appear elevated if collected in close proximity to the dose administered, this is not necessarily an indication of toxicity    Plan:  Dosing recommendations based on Bayesian software  Continue vancomycin 1000 mg every 24 hours - AUC is therapeutic  Renal labs as indicated   Vancomycin concentrations will be ordered as clinically appropriate   Pharmacy will continue to monitor patient and adjust therapy as indicated    Thank you for the consult,  Everardo Taamyo [facility]

## 2024-05-06 NOTE — PROGRESS NOTES
PT was in bed with Niece at bedside.  introduced self. Niece asked about anointing.  informed Niece that PT was anointed on 5/4 by Father Jose Carlos. Niece expressed gratitude. PT awoke. PT and Niece expressed gratitude for communion.  offered prayer and rosary.  checked for unmet needs and brought rosaries.  offered additional support.     Rev. Kylie Rangel M.Div.

## 2024-05-07 LAB
ALBUMIN SERPL-MCNC: 1.7 G/DL (ref 3.2–4.6)
ALBUMIN/GLOB SERPL: 0.4 (ref 1–1.9)
ALP SERPL-CCNC: 135 U/L (ref 35–104)
ALT SERPL-CCNC: 5 U/L (ref 12–65)
ANION GAP SERPL CALC-SCNC: 7 MMOL/L (ref 9–18)
AST SERPL-CCNC: 37 U/L (ref 15–37)
BASOPHILS # BLD: 0.1 K/UL (ref 0–0.2)
BASOPHILS NFR BLD: 1 % (ref 0–2)
BILIRUB SERPL-MCNC: 0.2 MG/DL (ref 0–1.2)
BUN SERPL-MCNC: 19 MG/DL (ref 8–23)
CALCIUM SERPL-MCNC: 12.2 MG/DL (ref 8.8–10.2)
CHLORIDE SERPL-SCNC: 107 MMOL/L (ref 98–107)
CO2 SERPL-SCNC: 23 MMOL/L (ref 20–28)
CREAT SERPL-MCNC: 0.68 MG/DL (ref 0.6–1.1)
DIFFERENTIAL METHOD BLD: ABNORMAL
EOSINOPHIL # BLD: 0.4 K/UL (ref 0–0.8)
EOSINOPHIL NFR BLD: 3 % (ref 0.5–7.8)
ERYTHROCYTE [DISTWIDTH] IN BLOOD BY AUTOMATED COUNT: 16.3 % (ref 11.9–14.6)
GLOBULIN SER CALC-MCNC: 4 G/DL (ref 2.3–3.5)
GLUCOSE SERPL-MCNC: 106 MG/DL (ref 70–99)
HCT VFR BLD AUTO: 25.9 % (ref 35.8–46.3)
HGB BLD-MCNC: 8.1 G/DL (ref 11.7–15.4)
IMM GRANULOCYTES # BLD AUTO: 0.3 K/UL (ref 0–0.5)
IMM GRANULOCYTES NFR BLD AUTO: 3 % (ref 0–5)
LYMPHOCYTES # BLD: 2.3 K/UL (ref 0.5–4.6)
LYMPHOCYTES NFR BLD: 19 % (ref 13–44)
MCH RBC QN AUTO: 27.4 PG (ref 26.1–32.9)
MCHC RBC AUTO-ENTMCNC: 31.3 G/DL (ref 31.4–35)
MCV RBC AUTO: 87.5 FL (ref 82–102)
MONOCYTES # BLD: 1.4 K/UL (ref 0.1–1.3)
MONOCYTES NFR BLD: 12 % (ref 4–12)
NEUTS SEG # BLD: 7.8 K/UL (ref 1.7–8.2)
NEUTS SEG NFR BLD: 62 % (ref 43–78)
NRBC # BLD: 0.02 K/UL (ref 0–0.2)
PLATELET # BLD AUTO: 353 K/UL (ref 150–450)
PMV BLD AUTO: 9.3 FL (ref 9.4–12.3)
POTASSIUM SERPL-SCNC: 4.2 MMOL/L (ref 3.5–5.1)
PROT SERPL-MCNC: 5.7 G/DL (ref 6.3–8.2)
RBC # BLD AUTO: 2.96 M/UL (ref 4.05–5.2)
SODIUM SERPL-SCNC: 137 MMOL/L (ref 136–145)
WBC # BLD AUTO: 12.3 K/UL (ref 4.3–11.1)

## 2024-05-07 PROCEDURE — 6360000002 HC RX W HCPCS: Performed by: INTERNAL MEDICINE

## 2024-05-07 PROCEDURE — 51702 INSERT TEMP BLADDER CATH: CPT

## 2024-05-07 PROCEDURE — 97530 THERAPEUTIC ACTIVITIES: CPT

## 2024-05-07 PROCEDURE — 2580000003 HC RX 258: Performed by: INTERNAL MEDICINE

## 2024-05-07 PROCEDURE — 6370000000 HC RX 637 (ALT 250 FOR IP): Performed by: INTERNAL MEDICINE

## 2024-05-07 PROCEDURE — 2580000003 HC RX 258: Performed by: STUDENT IN AN ORGANIZED HEALTH CARE EDUCATION/TRAINING PROGRAM

## 2024-05-07 PROCEDURE — 80053 COMPREHEN METABOLIC PANEL: CPT

## 2024-05-07 PROCEDURE — 6360000002 HC RX W HCPCS: Performed by: STUDENT IN AN ORGANIZED HEALTH CARE EDUCATION/TRAINING PROGRAM

## 2024-05-07 PROCEDURE — 36415 COLL VENOUS BLD VENIPUNCTURE: CPT

## 2024-05-07 PROCEDURE — 1100000000 HC RM PRIVATE

## 2024-05-07 PROCEDURE — 85025 COMPLETE CBC W/AUTO DIFF WBC: CPT

## 2024-05-07 PROCEDURE — 6370000000 HC RX 637 (ALT 250 FOR IP): Performed by: STUDENT IN AN ORGANIZED HEALTH CARE EDUCATION/TRAINING PROGRAM

## 2024-05-07 PROCEDURE — 97112 NEUROMUSCULAR REEDUCATION: CPT

## 2024-05-07 RX ORDER — ENOXAPARIN SODIUM 100 MG/ML
40 INJECTION SUBCUTANEOUS DAILY
Status: DISCONTINUED | OUTPATIENT
Start: 2024-05-07 | End: 2024-05-10 | Stop reason: HOSPADM

## 2024-05-07 RX ADMIN — CEFTRIAXONE SODIUM 2000 MG: 2 INJECTION, POWDER, FOR SOLUTION INTRAMUSCULAR; INTRAVENOUS at 17:50

## 2024-05-07 RX ADMIN — SODIUM CHLORIDE, PRESERVATIVE FREE 10 ML: 5 INJECTION INTRAVENOUS at 20:54

## 2024-05-07 RX ADMIN — LEVOTHYROXINE SODIUM 50 MCG: 0.05 TABLET ORAL at 05:10

## 2024-05-07 RX ADMIN — FERROUS SULFATE TAB 325 MG (65 MG ELEMENTAL FE) 325 MG: 325 (65 FE) TAB at 08:07

## 2024-05-07 RX ADMIN — ACETAMINOPHEN 650 MG: 325 TABLET ORAL at 20:54

## 2024-05-07 RX ADMIN — ACETAMINOPHEN 650 MG: 325 TABLET ORAL at 12:14

## 2024-05-07 RX ADMIN — VANCOMYCIN HYDROCHLORIDE 1000 MG: 1 INJECTION, POWDER, LYOPHILIZED, FOR SOLUTION INTRAVENOUS at 20:54

## 2024-05-07 RX ADMIN — ONDANSETRON 4 MG: 2 INJECTION INTRAMUSCULAR; INTRAVENOUS at 13:52

## 2024-05-07 RX ADMIN — METOPROLOL SUCCINATE 50 MG: 50 TABLET, EXTENDED RELEASE ORAL at 08:07

## 2024-05-07 ASSESSMENT — PAIN SCALES - GENERAL
PAINLEVEL_OUTOF10: 3
PAINLEVEL_OUTOF10: 0
PAINLEVEL_OUTOF10: 3
PAINLEVEL_OUTOF10: 3

## 2024-05-07 ASSESSMENT — PAIN DESCRIPTION - LOCATION
LOCATION: BACK
LOCATION: BACK

## 2024-05-07 ASSESSMENT — PAIN DESCRIPTION - DESCRIPTORS
DESCRIPTORS: ACHING
DESCRIPTORS: ACHING

## 2024-05-07 NOTE — PROGRESS NOTES
ACUTE OCCUPATIONAL THERAPY GOALS:   (Developed with and agreed upon by patient and/or caregiver.)  1. Patient will complete upper body bathing and dressing with minimal assistance and adaptive equipment as needed.   2. Patient will complete log rolling side to side in the bed with minimal assistance for positioning and hygiene.   3. Patient will tolerate 25 minutes of OT treatment with 2-3 rest breaks to increase activity tolerance for ADLs.   4. Patient will complete sitting balance edge of bed with minimal assistance to improve sitting balance for ADL.   5. Patient will complete grooming/self-feeding tasks with SBA to improve participation with self-care.      Timeframe: 7 visits     OCCUPATIONAL THERAPY: Daily Note PM   OT Visit Days: 2   Time In/Out  OT Charge Capture  Rehab Caseload Tracker  OT Orders    Brittany Chapin is a 89 y.o. female   PRIMARY DIAGNOSIS: Sepsis (HCC)  Tachycardia [R00.0]  Elevated brain natriuretic peptide (BNP) level [R79.89]  Sepsis (HCC) [A41.9]  Leukocytosis, unspecified type [D72.829]       Inpatient: Payor: [facility] MEDICARE / Plan: [facility] MEDICARE-ADVANTAGE PPO / Product Type: Medicare /     ASSESSMENT:     REHAB RECOMMENDATIONS:   Recommendation to date pending progress:  Setting:  Return to detention    Equipment:    To Be Determined     ASSESSMENT:  Ms. Chapin is doing fair today. Pt presents supine with sister at bedside and agreeable to therapy. Pt overall max Ax2 for bed mobility. Once sitting edge of bed, required mod-max A with sitting balance and able to tolerate ~5 minutes. However, pt became fatigued and nauseous requesting to lay back down. Once supine, pt began to throw up. Head of bed elevated due to this and assisted with holding emesis bag. Eventually this subsided and able to position pt higher in the bed, did require max Ax2 for scooting in the bed. Pt continues to have deficits in strength, balance, functional mobility, and activity tolerance. Steady progress this date.  Continue OT POC. .       SUBJECTIVE:     Ms. Chapin states, \"I am nauseous\"     Social/Functional Additional Comments: From Assisted Living Facility where pt was recieving assist with ADLs, t/f and mob    OBJECTIVE:     LINES / DRAINS / AIRWAY: External Catheter and IV    RESTRICTIONS/PRECAUTIONS:  Restrictions/Precautions  Restrictions/Precautions: Fall Risk        PAIN: VITALS / O2:   Pre Treatment: did not c/o pain           Post Treatment: same Vitals          Oxygen        MOBILITY: I Mod I S SBA CGA Min Mod Max Total  NT x2 Comments:   Bed Mobility    Rolling [] [] [] [] [] [] [] [] [] [x] []    Supine to Sit [] [] [] [] [] [] [] [x] [] [] [x]    Scooting [] [] [] [] [] [] [] [x] [x] [] [x]    Sit to Supine [] [] [] [] [] [] [] [x] [] [] [x]    Transfers    Sit to Stand [] [] [] [] [] [] [] [] [] [x] []    Bed to Chair [] [] [] [] [] [] [] [] [] [x] []    Stand to Sit [] [] [] [] [] [] [] [] [] [x] []    Tub/Shower [] [] [] [] [] [] [] [] [] [x] []     Toilet [] [] [] [] [] [] [] [] [] [x] []      [] [] [] [] [] [] [] [] [] [] []    I=Independent, Mod I=Modified Independent, S=Supervision/Setup, SBA=Standby Assistance, CGA=Contact Guard Assistance, Min=Minimal Assistance, Mod=Moderate Assistance, Max=Maximal Assistance, Total=Total Assistance, NT=Not Tested    ACTIVITIES OF DAILY LIVING: I Mod I S SBA CGA Min Mod Max Total NT Comments   BASIC ADLs:              Upper Body   Bathing [] [] [] [] [] [] [] [] [] [x]     Lower Body Bathing [] [] [] [] [] [] [] [] [] [x]     Toileting [] [] [] [] [] [] [] [] [] [x]    Upper Body Dressing [] [] [] [] [] [] [] [] [] [x]    Lower Body Dressing [] [] [] [] [] [] [] [] [] [x]    Feeding [] [] [] [] [] [] [] [] [] [x]    Grooming [] [] [] [] [] [] [] [] [] [x]    Personal Device Care [] [] [] [] [] [] [] [] [] [x]    Functional Mobility [] [] [] [] [] [] [] [x] [x] [] X2, bed mobility only   I=Independent, Mod I=Modified Independent, S=Supervision/Setup, SBA=Standby

## 2024-05-07 NOTE — PROGRESS NOTES
- 10.2 MG/DL    Total Bilirubin 0.2 0.0 - 1.2 MG/DL    ALT <5 (L) 12 - 65 U/L    AST 30 15 - 37 U/L    Alk Phosphatase 135 (H) 35 - 104 U/L    Total Protein 5.4 (L) 6.3 - 8.2 g/dL    Albumin 1.6 (L) 3.2 - 4.6 g/dL    Globulin 3.9 (H) 2.3 - 3.5 g/dL    Albumin/Globulin Ratio 0.4 (L) 1.0 - 1.9     CBC with Auto Differential    Collection Time: 05/06/24  3:43 AM   Result Value Ref Range    WBC 13.4 (H) 4.3 - 11.1 K/uL    RBC 2.84 (L) 4.05 - 5.2 M/uL    Hemoglobin 7.7 (L) 11.7 - 15.4 g/dL    Hematocrit 24.8 (L) 35.8 - 46.3 %    MCV 87.3 82 - 102 FL    MCH 27.1 26.1 - 32.9 PG    MCHC 31.0 (L) 31.4 - 35.0 g/dL    RDW 16.4 (H) 11.9 - 14.6 %    Platelets 347 150 - 450 K/uL    MPV 9.5 9.4 - 12.3 FL    nRBC 0.02 0.0 - 0.2 K/uL    Differential Type AUTOMATED      Neutrophils % 64 43 - 78 %    Lymphocytes % 19 13 - 44 %    Monocytes % 11 4.0 - 12.0 %    Eosinophils % 4 0.5 - 7.8 %    Basophils % 0 0.0 - 2.0 %    Immature Granulocytes % 2 0.0 - 5.0 %    Neutrophils Absolute 8.6 (H) 1.7 - 8.2 K/UL    Lymphocytes Absolute 2.5 0.5 - 4.6 K/UL    Monocytes Absolute 1.5 (H) 0.1 - 1.3 K/UL    Eosinophils Absolute 0.5 0.0 - 0.8 K/UL    Basophils Absolute 0.1 0.0 - 0.2 K/UL    Immature Granulocytes Absolute 0.3 0.0 - 0.5 K/UL   Comprehensive Metabolic Panel w/ Reflex to MG    Collection Time: 05/07/24  3:36 AM   Result Value Ref Range    Sodium 137 136 - 145 mmol/L    Potassium 4.2 3.5 - 5.1 mmol/L    Chloride 107 98 - 107 mmol/L    CO2 23 20 - 28 mmol/L    Anion Gap 7 (L) 9 - 18 mmol/L    Glucose 106 (H) 70 - 99 mg/dL    BUN 19 8 - 23 MG/DL    Creatinine 0.68 0.60 - 1.10 MG/DL    Est, Glom Filt Rate 83 >60 ml/min/1.73m2    Calcium 12.2 (H) 8.8 - 10.2 MG/DL    Total Bilirubin 0.2 0.0 - 1.2 MG/DL    ALT 5 (L) 12 - 65 U/L    AST 37 15 - 37 U/L    Alk Phosphatase 135 (H) 35 - 104 U/L    Total Protein 5.7 (L) 6.3 - 8.2 g/dL    Albumin 1.7 (L) 3.2 - 4.6 g/dL    Globulin 4.0 (H) 2.3 - 3.5 g/dL    Albumin/Globulin Ratio 0.4 (L) 1.0 - 1.9    0.9 % injection 5-40 mL  5-40 mL IntraVENous 2 times per day    sodium chloride flush 0.9 % injection 5-40 mL  5-40 mL IntraVENous PRN    0.9 % sodium chloride infusion   IntraVENous PRN    ondansetron (ZOFRAN-ODT) disintegrating tablet 4 mg  4 mg Oral Q8H PRN    Or    ondansetron (ZOFRAN) injection 4 mg  4 mg IntraVENous Q6H PRN    polyethylene glycol (GLYCOLAX) packet 17 g  17 g Oral Daily PRN    acetaminophen (TYLENOL) tablet 650 mg  650 mg Oral Q6H PRN    Or    acetaminophen (TYLENOL) suppository 650 mg  650 mg Rectal Q6H PRN       Signed:  Jeffrey Burroughs MD    Part of this note may have been written by using a voice dictation software.  The note has been proof read but may still contain some grammatical/other typographical errors.

## 2024-05-07 NOTE — PROGRESS NOTES
ACUTE PHYSICAL THERAPY GOALS:   (Developed with and agreed upon by patient and/or caregiver.)  (1.) Brittany Chapin  will move from supine to sit and sit to supine  with MODERATE ASSIST within 7 treatment day(s).    (2.) Brittany Chapin  will scoot up and down with MODERATE ASSIST within 7 treatment day(s).    (3.) Brittany Chapin  will roll side to side with MINIMAL ASSIST within 7 treatment day(s).    (4.) Brittany Chapin will transfer from bed to chair and chair to bed with MODERATE ASSIST using the least restrictive device within 7 treatment day(s).    (5.) Brittany Chapin will perform sitting static and dynamic balance activities x 10 minutes with CONTACT GUARD ASSIST to improve safety within 7 treatment day(s).  (6.) Brittany Chapin will perform therapeutic exercises x 10 min for HEP with MINIMAL ASSIST to improve strength, endurance, and functional mobility within 7 treatment day(s).     PHYSICAL THERAPY: Daily Note PM   (Link to Caseload Tracking: PT Visit Days : 3  Time In/Out PT Charge Capture  Rehab Caseload Tracker  Orders    Brittany Chapin is a 89 y.o. female   PRIMARY DIAGNOSIS: Sepsis (HCC)  Tachycardia [R00.0]  Elevated brain natriuretic peptide (BNP) level [R79.89]  Sepsis (HCC) [A41.9]  Leukocytosis, unspecified type [D72.829]       Inpatient: Payor: [facility] MEDICARE / Plan: [employer] MEDICARE-ADVANTAGE PPO / Product Type: Medicare /     ASSESSMENT:     REHAB RECOMMENDATIONS:   Recommendation to date pending progress:  Setting:  Return to long-term    Equipment:    To Be Determined     ASSESSMENT:  Ms. Chapin presents supine in bed with RN and niece in room, agreeable to sit on EOB. Pt required Total/Max A x2 with sup to sit secondary to decreased strength and balance, required Mod A with sitting balance, pt was able to tolerate approx 5-6 mins of sitting before needing to return to supine secondary to fatigue. Pt required total max A x2 with sit to supine, pt became nauseated and vomited once returned to supine, then required max A  x2 with scooting up in bed and repositioning for comfort and safety. Pt left supine in bed with RN and niece in room, needs within reach, limited progress, will continue to follow     SUBJECTIVE:   Ms. Chapin states, \"Hey\"     Social/Functional Additional Comments: From Assisted Living Facility where pt was recieving assist with ADLs, t/f and mob  OBJECTIVE:     PAIN: VITALS / O2: PRECAUTION / LINES / DRAINS:   Pre Treatment:   Pain Assessment: None - Denies Pain      Post Treatment: none stated Vitals        Oxygen    External Catheter and IV    RESTRICTIONS/PRECAUTIONS:  Restrictions/Precautions  Restrictions/Precautions: Fall Risk  Restrictions/Precautions: Fall Risk     MOBILITY: I Mod I S SBA CGA Min Mod Max Total  NT x2 Comments:   Bed Mobility    Rolling [] [] [] [] [] [] [] [] [] [] []    Supine to Sit [] [] [] [] [] [] [] [x] [x] [] [x]    Scooting [] [] [] [] [] [] [] [] [x] [] [x]    Sit to Supine [] [] [] [] [] [] [] [x] [x] [] [x]    Transfers    Sit to Stand [] [] [] [] [] [] [] [] [] [] []    Bed to Chair [] [] [] [] [] [] [] [] [] [] []    Stand to Sit [] [] [] [] [] [] [] [] [] [] []     [] [] [] [] [] [] [] [] [] [] []    I=Independent, Mod I=Modified Independent, S=Supervision, SBA=Standby Assistance, CGA=Contact Guard Assistance,   Min=Minimal Assistance, Mod=Moderate Assistance, Max=Maximal Assistance, Total=Total Assistance, NT=Not Tested    BALANCE: Good Fair+ Fair Fair- Poor NT Comments   Sitting Static [] [] [] [x] [] []    Sitting Dynamic [] [] [] [] [] []              Standing Static [] [] [] [] [] []    Standing Dynamic [] [] [] [] [] []      GAIT: I Mod I S SBA CGA Min Mod Max Total  NT x2 Comments:   Level of Assistance [] [] [] [] [] [] [] [] [] [x] []    Distance   feet    DME N/A    Gait Quality N/A    Weightbearing Status      Stairs      I=Independent, Mod I=Modified Independent, S=Supervision, SBA=Standby Assistance, CGA=Contact Guard Assistance,   Min=Minimal Assistance,

## 2024-05-07 NOTE — PROGRESS NOTES
Pt had one episode of nausea and vomiting. PRN Zofran given and pt stated she felt better. Pt didn't eat very much, but niece stated that is normal for her.

## 2024-05-07 NOTE — PROGRESS NOTES
Patient rested well this shift. She requested one dose of Tylenol at beginning of shift; see MAR. Patient bathed this AM. No distress noted.

## 2024-05-07 NOTE — PROGRESS NOTES
VANCO DAILY FOLLOW UP NOTE  [facility]   Pharmacy Pharmacokinetic Monitoring Service - Vancomycin    Consulting Provider: Dr. Lopez   Indication: SSTI/staph epi and proteus bacteremia  Target Concentration: Goal AUC/JAMILA 400-600 mg*hr/L  Day of Therapy: EOT 5/8/24 per ID  Additional Antimicrobials: ceftriaxone    Pertinent Laboratory Values:   Wt Readings from Last 1 Encounters:   05/02/24 74.4 kg (164 lb)     Temp Readings from Last 1 Encounters:   05/07/24 97.5 °F (36.4 °C) (Oral)     Recent Labs     05/05/24  0537 05/06/24  0343 05/07/24  0336   BUN 25* 23 19   CREATININE 0.79 0.71 0.68   WBC 14.8* 13.4* 12.3*     Estimated Creatinine Clearance: 57 mL/min (based on SCr of 0.68 mg/dL).    Lab Results   Component Value Date/Time    VANCORANDOM 15.4 05/03/2024 05:37 AM       MRSA Nasal Swab: N/A. Non-respiratory infection    Assessment:  Date/Time Dose Concentration AUC   5/3 0537 1000 mg q24h 15.4 556   Note: Serum concentrations collected for AUC dosing may appear elevated if collected in close proximity to the dose administered, this is not necessarily an indication of toxicity    Plan:  Dosing recommendations based on Bayesian software  Continue vancomycin 1000 mg every 24 hours - AUC is therapeutic  Renal labs as indicated   Vancomycin concentrations will be ordered as clinically appropriate   Pharmacy will continue to monitor patient and adjust therapy as indicated    Thank you for the consult,  Everardo Tamayo [facility]

## 2024-05-07 NOTE — CARE COORDINATION
Rojelio requested Palliative Care Consult, CM entered order. CM will continue to follow for dc plan/needs.

## 2024-05-08 LAB
ALBUMIN SERPL-MCNC: 1.6 G/DL (ref 3.2–4.6)
ALBUMIN/GLOB SERPL: 0.5 (ref 1–1.9)
ALP SERPL-CCNC: 133 U/L (ref 35–104)
ALT SERPL-CCNC: 12 U/L (ref 12–65)
ANION GAP SERPL CALC-SCNC: 9 MMOL/L (ref 9–18)
AST SERPL-CCNC: 42 U/L (ref 15–37)
BACTERIA SPEC CULT: NORMAL
BACTERIA SPEC CULT: NORMAL
BASOPHILS # BLD: 0.1 K/UL (ref 0–0.2)
BASOPHILS NFR BLD: 1 % (ref 0–2)
BILIRUB SERPL-MCNC: 0.2 MG/DL (ref 0–1.2)
BUN SERPL-MCNC: 18 MG/DL (ref 8–23)
CALCIUM SERPL-MCNC: 10.7 MG/DL (ref 8.8–10.2)
CHLORIDE SERPL-SCNC: 106 MMOL/L (ref 98–107)
CO2 SERPL-SCNC: 22 MMOL/L (ref 20–28)
CREAT SERPL-MCNC: 0.67 MG/DL (ref 0.6–1.1)
DIFFERENTIAL METHOD BLD: ABNORMAL
EOSINOPHIL # BLD: 0.4 K/UL (ref 0–0.8)
EOSINOPHIL NFR BLD: 3 % (ref 0.5–7.8)
ERYTHROCYTE [DISTWIDTH] IN BLOOD BY AUTOMATED COUNT: 16.7 % (ref 11.9–14.6)
GLOBULIN SER CALC-MCNC: 3.4 G/DL (ref 2.3–3.5)
GLUCOSE SERPL-MCNC: 110 MG/DL (ref 70–99)
HCT VFR BLD AUTO: 21.9 % (ref 35.8–46.3)
HGB BLD-MCNC: 6.8 G/DL (ref 11.7–15.4)
HGB BLD-MCNC: 7.5 G/DL (ref 11.7–15.4)
IMM GRANULOCYTES # BLD AUTO: 0.2 K/UL (ref 0–0.5)
IMM GRANULOCYTES NFR BLD AUTO: 2 % (ref 0–5)
LYMPHOCYTES # BLD: 2.6 K/UL (ref 0.5–4.6)
LYMPHOCYTES NFR BLD: 19 % (ref 13–44)
MCH RBC QN AUTO: 27.4 PG (ref 26.1–32.9)
MCHC RBC AUTO-ENTMCNC: 31.1 G/DL (ref 31.4–35)
MCV RBC AUTO: 88.3 FL (ref 82–102)
MONOCYTES # BLD: 1.7 K/UL (ref 0.1–1.3)
MONOCYTES NFR BLD: 13 % (ref 4–12)
NEUTS SEG # BLD: 8.5 K/UL (ref 1.7–8.2)
NEUTS SEG NFR BLD: 63 % (ref 43–78)
NRBC # BLD: 0.02 K/UL (ref 0–0.2)
PLATELET # BLD AUTO: 344 K/UL (ref 150–450)
PMV BLD AUTO: 9.1 FL (ref 9.4–12.3)
POTASSIUM SERPL-SCNC: 4 MMOL/L (ref 3.5–5.1)
PROT SERPL-MCNC: 5 G/DL (ref 6.3–8.2)
RBC # BLD AUTO: 2.48 M/UL (ref 4.05–5.2)
SERVICE CMNT-IMP: NORMAL
SERVICE CMNT-IMP: NORMAL
SODIUM SERPL-SCNC: 137 MMOL/L (ref 136–145)
WBC # BLD AUTO: 13.3 K/UL (ref 4.3–11.1)

## 2024-05-08 PROCEDURE — 6360000002 HC RX W HCPCS: Performed by: INTERNAL MEDICINE

## 2024-05-08 PROCEDURE — 6370000000 HC RX 637 (ALT 250 FOR IP): Performed by: INTERNAL MEDICINE

## 2024-05-08 PROCEDURE — 1100000000 HC RM PRIVATE

## 2024-05-08 PROCEDURE — 2580000003 HC RX 258: Performed by: INTERNAL MEDICINE

## 2024-05-08 PROCEDURE — 51701 INSERT BLADDER CATHETER: CPT

## 2024-05-08 PROCEDURE — 36415 COLL VENOUS BLD VENIPUNCTURE: CPT

## 2024-05-08 PROCEDURE — 99231 SBSQ HOSP IP/OBS SF/LOW 25: CPT | Performed by: NURSE PRACTITIONER

## 2024-05-08 PROCEDURE — 85025 COMPLETE CBC W/AUTO DIFF WBC: CPT

## 2024-05-08 PROCEDURE — 85018 HEMOGLOBIN: CPT

## 2024-05-08 PROCEDURE — 6370000000 HC RX 637 (ALT 250 FOR IP): Performed by: STUDENT IN AN ORGANIZED HEALTH CARE EDUCATION/TRAINING PROGRAM

## 2024-05-08 PROCEDURE — 2580000003 HC RX 258: Performed by: STUDENT IN AN ORGANIZED HEALTH CARE EDUCATION/TRAINING PROGRAM

## 2024-05-08 PROCEDURE — 51798 US URINE CAPACITY MEASURE: CPT

## 2024-05-08 PROCEDURE — 80053 COMPREHEN METABOLIC PANEL: CPT

## 2024-05-08 RX ORDER — LIDOCAINE 4 G/G
1 PATCH TOPICAL DAILY
Status: DISCONTINUED | OUTPATIENT
Start: 2024-05-08 | End: 2024-05-10 | Stop reason: HOSPADM

## 2024-05-08 RX ORDER — ZOLEDRONIC ACID 0.04 MG/ML
4 INJECTION, SOLUTION INTRAVENOUS ONCE
Status: DISCONTINUED | OUTPATIENT
Start: 2024-05-08 | End: 2024-05-08

## 2024-05-08 RX ADMIN — FERROUS SULFATE TAB 325 MG (65 MG ELEMENTAL FE) 325 MG: 325 (65 FE) TAB at 07:59

## 2024-05-08 RX ADMIN — SODIUM CHLORIDE: 9 INJECTION, SOLUTION INTRAVENOUS at 13:10

## 2024-05-08 RX ADMIN — METOPROLOL SUCCINATE 50 MG: 50 TABLET, EXTENDED RELEASE ORAL at 07:59

## 2024-05-08 RX ADMIN — ENOXAPARIN SODIUM 40 MG: 100 INJECTION SUBCUTANEOUS at 07:59

## 2024-05-08 RX ADMIN — SODIUM CHLORIDE: 9 INJECTION, SOLUTION INTRAVENOUS at 01:57

## 2024-05-08 RX ADMIN — CEFTRIAXONE SODIUM 2000 MG: 2 INJECTION, POWDER, FOR SOLUTION INTRAMUSCULAR; INTRAVENOUS at 19:26

## 2024-05-08 RX ADMIN — VANCOMYCIN HYDROCHLORIDE 1000 MG: 1 INJECTION, POWDER, LYOPHILIZED, FOR SOLUTION INTRAVENOUS at 20:44

## 2024-05-08 RX ADMIN — LEVOTHYROXINE SODIUM 50 MCG: 0.05 TABLET ORAL at 05:24

## 2024-05-08 RX ADMIN — SODIUM CHLORIDE, PRESERVATIVE FREE 10 ML: 5 INJECTION INTRAVENOUS at 21:22

## 2024-05-08 NOTE — PROGRESS NOTES
Palliative Care Progress Note    Patient: Brittany Chapin MRN: [id number]  SSN: [id number]    YOB: 1933  Age: 89 y.o.  Sex: female       Assessment/Plan:     Chief Complaint/Interval History: alert, reports pain in her legs       Principal Diagnosis:    Pain, limb  M79.609    Additional Diagnoses:   Debility, Unspecified  R53.81  Frailty  R54  Counseling, Encounter for Medical Advice  Z71.9  Encounter for Palliative Care  Z51.5    Palliative Performance Scale (PPS)       Medical Decision Making:   Reviewed and summarized notes over last week  Discussed case with appropriate providers  Reviewed laboratory and x-ray data over last week     Pt resting in bed, no distress noted.  No visitors at bedside.  Pt reports leg pain at present, and states the nurse just gave her some pain medication.  Pt stated she wants to go home.  Unsure of discharge plans at present.  Attempted to speak with pt's niece, Lyric, via phone.  Left message asking for return call.  If family desires a home based PC program at home, CM will need to arrange this, as we do not offer home based PC visits.  We will continue to follow.    Will discuss findings with members of the interdisciplinary team.         More than 50% of this 25 minute visit was spent counseling and coordination of care as outlined above.    Subjective:     Review of Systems:  A comprehensive review of systems was negative except for:   Constitutional: Positive for fatigue.  Musculoskeletal: Positive for leg pain     Objective:     Visit Vitals  BP (!) 155/67   Pulse 89   Temp 98.2 °F (36.8 °C) (Oral)   Resp 22   Ht 1.676 m (5' 5.98\")   Wt 74.4 kg (164 lb)   SpO2 94%   BMI 26.48 kg/m²       Physical Exam:    General:  Cooperative. Debilitated and frail. No acute distress.   Eyes:  Conjunctivae/corneas clear    Nose: Nares normal. Septum midline.   Neck: Supple, symmetrical, trachea midline   Lungs:   unlabored   Heart:  Regular rate and rhythm   Abdomen:   Soft,

## 2024-05-08 NOTE — PROGRESS NOTES
Pt  bed in lowest position, wheels locked, and call light within reach. Hourly rounds completed. VSS. IV is patent and dressing is clean, dry, and intact. Pain treated per MAR. Pt turned throughout the shift. [employer] patent and draining.

## 2024-05-08 NOTE — CARE COORDINATION
Dispo update:  CM told Ms. Chapin in room 603 that Dr. Burroughs said that she would likely be stable for transfer back to [facility] tomorrow.  CM also called her niece Ms. Lyric Kurtz at [phone] and updated her.  She said that they she had received a letter via certified mail that stated that Ms. Chapin would have had to be out of the [facility] by May 19, 2024 (this was not know by CM).      CM called Ms. Urmila Mccann at [phone] and spoke to her and RN Antoinette.  They confirmed the May 19 deadline, but said that Ms. Chapin could not return, even though their letter states May 19.  They cited concerns about her stage 4 sacral wound (which was present when she arrived from [facility]) and her level of care (too debilitated) for [facility] level of care.  Thus, they said that she could not come back.    CM called patient's niece and discussed this.  She said that Ms. Chapin has enough money to private pay for SNF, and is interested in referrals being sent to [facility] (first choice), [facility], and [facility].  Referrals sent via [email or web address], and CM messaged admissions coordinators for all three facilities.  CM also updated Dr. Burroughs.  Await SNF reviews.

## 2024-05-08 NOTE — CONSULTS
This is a re-consult.  Please see progress note from today.    TESFAYE Burnette  Palliative Care

## 2024-05-08 NOTE — PLAN OF CARE
Problem: Discharge Planning  Goal: Discharge to home or other facility with appropriate resources  Outcome: Progressing  Flowsheets (Taken 5/7/2024 1927)  Discharge to home or other facility with appropriate resources:   Identify barriers to discharge with patient and caregiver   Arrange for needed discharge resources and transportation as appropriate     Problem: Pain  Goal: Verbalizes/displays adequate comfort level or baseline comfort level  Outcome: Progressing  Flowsheets (Taken 5/7/2024 1926)  Verbalizes/displays adequate comfort level or baseline comfort level:   Encourage patient to monitor pain and request assistance   Assess pain using appropriate pain scale   Administer analgesics based on type and severity of pain and evaluate response     Problem: Skin/Tissue Integrity  Goal: Absence of new skin breakdown  Description: 1.  Monitor for areas of redness and/or skin breakdown  2.  Assess vascular access sites hourly  3.  Every 4-6 hours minimum:  Change oxygen saturation probe site  4.  Every 4-6 hours:  If on nasal continuous positive airway pressure, respiratory therapy assess nares and determine need for appliance change or resting period.  Outcome: Progressing     Problem: Safety - Adult  Goal: Free from fall injury  Outcome: Progressing     Problem: ABCDS Injury Assessment  Goal: Absence of physical injury  Outcome: Progressing     Problem: Nutrition Deficit:  Goal: Optimize nutritional status  Outcome: Progressing

## 2024-05-08 NOTE — PROGRESS NOTES
Comprehensive Nutrition Assessment    Type and Reason for Visit: Reassess  Best Practice Alert for Pressure Injury Stage 2 or greater    Nutrition Recommendations/Plan:   Meals and Snacks:  Diet: Continue current order  Nutrition Supplement Therapy:  Medical food supplement therapy:  Continue Ensure Enlive three times per day (this provides 350 kcal and 20 grams protein per bottle)      Malnutrition Assessment:  Malnutrition Status: At risk for malnutrition (Comment) (compromised appetite adn intake, +wt loss, bed bound, increased needs r/t wound)     Nutrition Assessment:  Nutrition History: Relocated 4 weeks ago from [place]. The first week she was eating well.  Oral intake then declined due to dislike of food and compromised appetite.  She has been consuming 2 ensure daily.       Do You Have Any Cultural, Confucianist, or Ethnic Food Preferences?: No   Weight History: No EMR wt hx available.  Pt and family reports #, stated wt on admission 160#.  Potential for 14# wt loss unable to validate.    Nutrition Background:       PMH remarkable for breast cancer with metastatic disease to cervical spine.  Presented with c/o abdominal pain and chest pain for several days.  Admitted with sepsis due to sacral wound POA, breast cancer w metastatic disease to spine, HTN, chronic normocytic anemia, hypothyroidism.    WC 5/2: wound on sacrum is stable eschar   Nutrition Interval:  Pt seen laying in bed with lunch that was just delivered. RD offered to assist pt with meal set up however she declined. She stated she is only eating bites of her meals. She stated she is consuming all of her Ensure. Discussed with Mariajose LAL and Janelle PCT who report pt did well with meals and Ensure yesterday however she had an emesis episode after lunch.     Current Nutrition Therapies:  ADULT DIET; Easy to Chew  ADULT ORAL NUTRITION SUPPLEMENT; Breakfast, Lunch, Dinner; Standard High Calorie/High Protein Oral Supplement    Current Intake:

## 2024-05-08 NOTE — PROGRESS NOTES
VANCO DAILY FOLLOW UP NOTE  [facility]   Pharmacy Pharmacokinetic Monitoring Service - Vancomycin    Consulting Provider: Dr. Lopez   Indication: SSTI/staph epi and proteus bacteremia  Target Concentration: Goal AUC/JAMILA 400-600 mg*hr/L  Day of Therapy: EOT 5/8/24 per ID  Additional Antimicrobials: ceftriaxone    Pertinent Laboratory Values:   Wt Readings from Last 1 Encounters:   05/02/24 74.4 kg (164 lb)     Temp Readings from Last 1 Encounters:   05/08/24 98.2 °F (36.8 °C) (Oral)     Recent Labs     05/06/24  0343 05/07/24  0336 05/08/24  0347   BUN 23 19 18   CREATININE 0.71 0.68 0.67   WBC 13.4* 12.3* 13.3*     Estimated Creatinine Clearance: 58 mL/min (based on SCr of 0.67 mg/dL).    Lab Results   Component Value Date/Time    VANCORANDOM 15.4 05/03/2024 05:37 AM       MRSA Nasal Swab: N/A. Non-respiratory infection    Assessment:  Date/Time Dose Concentration AUC   5/3 0537 1000 mg q24h 15.4 556   Note: Serum concentrations collected for AUC dosing may appear elevated if collected in close proximity to the dose administered, this is not necessarily an indication of toxicity    Plan:  Dosing recommendations based on Bayesian software  Continue vancomycin 1000 mg every 24 hours - AUC is therapeutic  Renal labs as indicated   Vancomycin concentrations will be ordered as clinically appropriate   Pharmacy will continue to monitor patient and adjust therapy as indicated    Thank you for the consult,  Everardo Tamayo [facility]

## 2024-05-09 LAB
ALBUMIN SERPL-MCNC: 1.8 G/DL (ref 3.2–4.6)
ALBUMIN/GLOB SERPL: 0.4 (ref 1–1.9)
ALP SERPL-CCNC: 137 U/L (ref 35–104)
ALT SERPL-CCNC: 14 U/L (ref 12–65)
ANION GAP SERPL CALC-SCNC: 10 MMOL/L (ref 9–18)
AST SERPL-CCNC: 54 U/L (ref 15–37)
BASOPHILS # BLD: 0.1 K/UL (ref 0–0.2)
BASOPHILS NFR BLD: 1 % (ref 0–2)
BILIRUB SERPL-MCNC: 0.2 MG/DL (ref 0–1.2)
BUN SERPL-MCNC: 13 MG/DL (ref 8–23)
CALCIUM SERPL-MCNC: 10.1 MG/DL (ref 8.8–10.2)
CHLORIDE SERPL-SCNC: 108 MMOL/L (ref 98–107)
CO2 SERPL-SCNC: 22 MMOL/L (ref 20–28)
CREAT SERPL-MCNC: 0.61 MG/DL (ref 0.6–1.1)
DIFFERENTIAL METHOD BLD: ABNORMAL
EOSINOPHIL # BLD: 0.3 K/UL (ref 0–0.8)
EOSINOPHIL NFR BLD: 2 % (ref 0.5–7.8)
ERYTHROCYTE [DISTWIDTH] IN BLOOD BY AUTOMATED COUNT: 16.9 % (ref 11.9–14.6)
GLOBULIN SER CALC-MCNC: 4.1 G/DL (ref 2.3–3.5)
GLUCOSE SERPL-MCNC: 100 MG/DL (ref 70–99)
HCT VFR BLD AUTO: 26.2 % (ref 35.8–46.3)
HGB BLD-MCNC: 8.1 G/DL (ref 11.7–15.4)
IMM GRANULOCYTES # BLD AUTO: 0.2 K/UL (ref 0–0.5)
IMM GRANULOCYTES NFR BLD AUTO: 2 % (ref 0–5)
LYMPHOCYTES # BLD: 2.3 K/UL (ref 0.5–4.6)
LYMPHOCYTES NFR BLD: 19 % (ref 13–44)
MCH RBC QN AUTO: 27 PG (ref 26.1–32.9)
MCHC RBC AUTO-ENTMCNC: 30.9 G/DL (ref 31.4–35)
MCV RBC AUTO: 87.3 FL (ref 82–102)
MONOCYTES # BLD: 1.5 K/UL (ref 0.1–1.3)
MONOCYTES NFR BLD: 12 % (ref 4–12)
NEUTS SEG # BLD: 7.8 K/UL (ref 1.7–8.2)
NEUTS SEG NFR BLD: 64 % (ref 43–78)
NRBC # BLD: 0.03 K/UL (ref 0–0.2)
PLATELET # BLD AUTO: 373 K/UL (ref 150–450)
PMV BLD AUTO: 9.2 FL (ref 9.4–12.3)
POTASSIUM SERPL-SCNC: 4 MMOL/L (ref 3.5–5.1)
PROT SERPL-MCNC: 5.9 G/DL (ref 6.3–8.2)
RBC # BLD AUTO: 3 M/UL (ref 4.05–5.2)
SODIUM SERPL-SCNC: 140 MMOL/L (ref 136–145)
WBC # BLD AUTO: 12.2 K/UL (ref 4.3–11.1)

## 2024-05-09 PROCEDURE — 85025 COMPLETE CBC W/AUTO DIFF WBC: CPT

## 2024-05-09 PROCEDURE — 6370000000 HC RX 637 (ALT 250 FOR IP): Performed by: STUDENT IN AN ORGANIZED HEALTH CARE EDUCATION/TRAINING PROGRAM

## 2024-05-09 PROCEDURE — 6370000000 HC RX 637 (ALT 250 FOR IP): Performed by: INTERNAL MEDICINE

## 2024-05-09 PROCEDURE — 2580000003 HC RX 258: Performed by: INTERNAL MEDICINE

## 2024-05-09 PROCEDURE — 6360000002 HC RX W HCPCS: Performed by: INTERNAL MEDICINE

## 2024-05-09 PROCEDURE — 97530 THERAPEUTIC ACTIVITIES: CPT

## 2024-05-09 PROCEDURE — 1100000000 HC RM PRIVATE

## 2024-05-09 PROCEDURE — 36415 COLL VENOUS BLD VENIPUNCTURE: CPT

## 2024-05-09 PROCEDURE — 51702 INSERT TEMP BLADDER CATH: CPT

## 2024-05-09 PROCEDURE — 2580000003 HC RX 258: Performed by: STUDENT IN AN ORGANIZED HEALTH CARE EDUCATION/TRAINING PROGRAM

## 2024-05-09 PROCEDURE — 51798 US URINE CAPACITY MEASURE: CPT

## 2024-05-09 PROCEDURE — 80053 COMPREHEN METABOLIC PANEL: CPT

## 2024-05-09 RX ADMIN — LEVOTHYROXINE SODIUM 50 MCG: 0.05 TABLET ORAL at 05:27

## 2024-05-09 RX ADMIN — METOPROLOL SUCCINATE 50 MG: 50 TABLET, EXTENDED RELEASE ORAL at 09:46

## 2024-05-09 RX ADMIN — FERROUS SULFATE TAB 325 MG (65 MG ELEMENTAL FE) 325 MG: 325 (65 FE) TAB at 09:46

## 2024-05-09 RX ADMIN — DAKIN'S SOLUTION 0.125% (QUARTER STRENGTH): 0.12 SOLUTION at 09:52

## 2024-05-09 RX ADMIN — ENOXAPARIN SODIUM 40 MG: 100 INJECTION SUBCUTANEOUS at 09:46

## 2024-05-09 RX ADMIN — SODIUM CHLORIDE, PRESERVATIVE FREE 10 ML: 5 INJECTION INTRAVENOUS at 09:46

## 2024-05-09 RX ADMIN — SODIUM CHLORIDE, PRESERVATIVE FREE 10 ML: 5 INJECTION INTRAVENOUS at 20:40

## 2024-05-09 RX ADMIN — SODIUM CHLORIDE: 9 INJECTION, SOLUTION INTRAVENOUS at 01:24

## 2024-05-09 ASSESSMENT — PAIN DESCRIPTION - DESCRIPTORS: DESCRIPTORS: ACHING

## 2024-05-09 ASSESSMENT — PAIN SCALES - GENERAL
PAINLEVEL_OUTOF10: 2
PAINLEVEL_OUTOF10: 0

## 2024-05-09 ASSESSMENT — PAIN DESCRIPTION - LOCATION: LOCATION: GENERALIZED

## 2024-05-09 NOTE — CARE COORDINATION
Dispo update:  As noted yesterday, [facility] will not allow Ms. Chapin to return to their facility.  Referrals have been sent out to 4 SNFs for their review for private-pay long-term care:  [facility] (first choice), [facility], [facility], and [facility] on [address].      Both [facility] and [facility] said that they were full and that a bed was not available; however, [facility] said they have a semi-private room, and [facility] [address] said that they have a private room.      CM updated Ms. Chapin's niece, Ms. Lyric Mcguire.  She is going tour [facility] [address] (to see Ms. Summer Rosas) and [facility] (to see Ms. Angeli Lopez, madeleine Olivares).  CM on stand-by until leti reports back.

## 2024-05-09 NOTE — PROGRESS NOTES
Pt wound care done this shift per order. Wound care consulted for pt heels that were not in wound care orders. Pt turned q2. Pt is resting in bed without any complaints. Hourly rounds completed and all needs met. Bed is low, locked,call light is in reach and pt is encouraged to call for assistance.

## 2024-05-09 NOTE — PROGRESS NOTES
ACUTE PHYSICAL THERAPY GOALS:   (Developed with and agreed upon by patient and/or caregiver.)  (1.) Brittany Chapin  will move from supine to sit and sit to supine  with MODERATE ASSIST within 7 treatment day(s).    (2.) Brittany Chapin  will scoot up and down with MODERATE ASSIST within 7 treatment day(s).    (3.) Brittany Chapin  will roll side to side with MINIMAL ASSIST within 7 treatment day(s).    (4.) Brittany Chapin will transfer from bed to chair and chair to bed with MODERATE ASSIST using the least restrictive device within 7 treatment day(s).    (5.) Brittany Chapin will perform sitting static and dynamic balance activities x 10 minutes with CONTACT GUARD ASSIST to improve safety within 7 treatment day(s).  (6.) Brittany Chapin will perform therapeutic exercises x 10 min for HEP with MINIMAL ASSIST to improve strength, endurance, and functional mobility within 7 treatment day(s).     PHYSICAL THERAPY: Daily Note PM   (Link to Caseload Tracking: PT Visit Days : 4  Time In/Out PT Charge Capture  Rehab Caseload Tracker  Orders    Brittany Chapin is a 89 y.o. female   PRIMARY DIAGNOSIS: Sepsis (HCC)  Tachycardia [R00.0]  Elevated brain natriuretic peptide (BNP) level [R79.89]  Sepsis (HCC) [A41.9]  Leukocytosis, unspecified type [D72.829]       Inpatient: Payor: [employer] / Plan: [facility] MEDICARE-ADVANTAGE PPO / Product Type: Medicare /     ASSESSMENT:     REHAB RECOMMENDATIONS:   Recommendation to date pending progress:  Setting:  Return to longterm    Equipment:    To Be Determined     ASSESSMENT:  Ms. Chapin presents supine in bed, agreeable to sit on EOB. Pt required Total/Max A with sup to sit secondary to decreased strength and balance, required Min A with sitting balance, pt was able to tolerate approx 4 mins of sitting before needing to return to supine secondary to fatigue. Pt required total/max A with sit to supine, then required max A x2 with scooting up in bed and repositioning for comfort and safety. Pt left supine in bed,

## 2024-05-09 NOTE — PROGRESS NOTES
MG/DL    Creatinine 0.67 0.60 - 1.10 MG/DL    Est, Glom Filt Rate 83 >60 ml/min/1.73m2    Calcium 10.7 (H) 8.8 - 10.2 MG/DL    Total Bilirubin 0.2 0.0 - 1.2 MG/DL    ALT 12 12 - 65 U/L    AST 42 (H) 15 - 37 U/L    Alk Phosphatase 133 (H) 35 - 104 U/L    Total Protein 5.0 (L) 6.3 - 8.2 g/dL    Albumin 1.6 (L) 3.2 - 4.6 g/dL    Globulin 3.4 2.3 - 3.5 g/dL    Albumin/Globulin Ratio 0.5 (L) 1.0 - 1.9     CBC with Auto Differential    Collection Time: 05/08/24  3:47 AM   Result Value Ref Range    WBC 13.3 (H) 4.3 - 11.1 K/uL    RBC 2.48 (L) 4.05 - 5.2 M/uL    Hemoglobin 6.8 (LL) 11.7 - 15.4 g/dL    Hematocrit 21.9 (L) 35.8 - 46.3 %    MCV 88.3 82 - 102 FL    MCH 27.4 26.1 - 32.9 PG    MCHC 31.1 (L) 31.4 - 35.0 g/dL    RDW 16.7 (H) 11.9 - 14.6 %    Platelets 344 150 - 450 K/uL    MPV 9.1 (L) 9.4 - 12.3 FL    nRBC 0.02 0.0 - 0.2 K/uL    Differential Type AUTOMATED      Neutrophils % 63 43 - 78 %    Lymphocytes % 19 13 - 44 %    Monocytes % 13 (H) 4.0 - 12.0 %    Eosinophils % 3 0.5 - 7.8 %    Basophils % 1 0.0 - 2.0 %    Immature Granulocytes % 2 0.0 - 5.0 %    Neutrophils Absolute 8.5 (H) 1.7 - 8.2 K/UL    Lymphocytes Absolute 2.6 0.5 - 4.6 K/UL    Monocytes Absolute 1.7 (H) 0.1 - 1.3 K/UL    Eosinophils Absolute 0.4 0.0 - 0.8 K/UL    Basophils Absolute 0.1 0.0 - 0.2 K/UL    Immature Granulocytes Absolute 0.2 0.0 - 0.5 K/UL   Hemoglobin    Collection Time: 05/08/24  6:06 AM   Result Value Ref Range    Hemoglobin 7.5 (L) 11.7 - 15.4 g/dL   Comprehensive Metabolic Panel w/ Reflex to MG    Collection Time: 05/09/24  3:42 AM   Result Value Ref Range    Sodium 140 136 - 145 mmol/L    Potassium 4.0 3.5 - 5.1 mmol/L    Chloride 108 (H) 98 - 107 mmol/L    CO2 22 20 - 28 mmol/L    Anion Gap 10 9 - 18 mmol/L    Glucose 100 (H) 70 - 99 mg/dL    BUN 13 8 - 23 MG/DL    Creatinine 0.61 0.60 - 1.10 MG/DL    Est, Glom Filt Rate 85 >60 ml/min/1.73m2    Calcium 10.1 8.8 - 10.2 MG/DL    Total Bilirubin 0.2 0.0 - 1.2 MG/DL    ALT 14 12 - 65  U/L    AST 54 (H) 15 - 37 U/L    Alk Phosphatase 137 (H) 35 - 104 U/L    Total Protein 5.9 (L) 6.3 - 8.2 g/dL    Albumin 1.8 (L) 3.2 - 4.6 g/dL    Globulin 4.1 (H) 2.3 - 3.5 g/dL    Albumin/Globulin Ratio 0.4 (L) 1.0 - 1.9     CBC with Auto Differential    Collection Time: 05/09/24  3:42 AM   Result Value Ref Range    WBC 12.2 (H) 4.3 - 11.1 K/uL    RBC 3.00 (L) 4.05 - 5.2 M/uL    Hemoglobin 8.1 (L) 11.7 - 15.4 g/dL    Hematocrit 26.2 (L) 35.8 - 46.3 %    MCV 87.3 82 - 102 FL    MCH 27.0 26.1 - 32.9 PG    MCHC 30.9 (L) 31.4 - 35.0 g/dL    RDW 16.9 (H) 11.9 - 14.6 %    Platelets 373 150 - 450 K/uL    MPV 9.2 (L) 9.4 - 12.3 FL    nRBC 0.03 0.0 - 0.2 K/uL    Differential Type AUTOMATED      Neutrophils % 64 43 - 78 %    Lymphocytes % 19 13 - 44 %    Monocytes % 12 4.0 - 12.0 %    Eosinophils % 2 0.5 - 7.8 %    Basophils % 1 0.0 - 2.0 %    Immature Granulocytes % 2 0.0 - 5.0 %    Neutrophils Absolute 7.8 1.7 - 8.2 K/UL    Lymphocytes Absolute 2.3 0.5 - 4.6 K/UL    Monocytes Absolute 1.5 (H) 0.1 - 1.3 K/UL    Eosinophils Absolute 0.3 0.0 - 0.8 K/UL    Basophils Absolute 0.1 0.0 - 0.2 K/UL    Immature Granulocytes Absolute 0.2 0.0 - 0.5 K/UL       No results for input(s): \"COVID19\" in the last 72 hours.    Current Meds:  Current Facility-Administered Medications   Medication Dose Route Frequency    lidocaine 4 % external patch 1 patch  1 patch TransDERmal Daily    enoxaparin (LOVENOX) injection 40 mg  40 mg SubCUTAneous Daily    ferrous sulfate (IRON 325) tablet 325 mg  325 mg Oral Daily with breakfast    0.9 % sodium chloride infusion   IntraVENous Continuous    0.9 % sodium chloride infusion   IntraVENous PRN    levothyroxine (SYNTHROID) tablet 50 mcg  50 mcg Oral Daily    metoprolol succinate (TOPROL XL) extended release tablet 50 mg  50 mg Oral Daily    HYDROcodone-acetaminophen (NORCO) 5-325 MG per tablet 1 tablet  1 tablet Oral Q6H PRN    morphine injection 2 mg  2 mg IntraVENous Q4H PRN    sodium hypochlorite

## 2024-05-10 VITALS
HEIGHT: 66 IN | HEART RATE: 86 BPM | TEMPERATURE: 98.2 F | WEIGHT: 164 LBS | OXYGEN SATURATION: 96 % | DIASTOLIC BLOOD PRESSURE: 68 MMHG | SYSTOLIC BLOOD PRESSURE: 152 MMHG | BODY MASS INDEX: 26.36 KG/M2 | RESPIRATION RATE: 18 BRPM

## 2024-05-10 PROBLEM — I82.409 DVT (DEEP VENOUS THROMBOSIS) (HCC): Status: ACTIVE | Noted: 2024-05-10

## 2024-05-10 LAB
BASOPHILS # BLD: 0.1 K/UL (ref 0–0.2)
BASOPHILS NFR BLD: 1 % (ref 0–2)
DIFFERENTIAL METHOD BLD: ABNORMAL
EOSINOPHIL # BLD: 0.1 K/UL (ref 0–0.8)
EOSINOPHIL NFR BLD: 1 % (ref 0.5–7.8)
ERYTHROCYTE [DISTWIDTH] IN BLOOD BY AUTOMATED COUNT: 17.2 % (ref 11.9–14.6)
HCT VFR BLD AUTO: 25.2 % (ref 35.8–46.3)
HGB BLD-MCNC: 7.9 G/DL (ref 11.7–15.4)
IMM GRANULOCYTES # BLD AUTO: 0.2 K/UL (ref 0–0.5)
IMM GRANULOCYTES NFR BLD AUTO: 2 % (ref 0–5)
LYMPHOCYTES # BLD: 2.4 K/UL (ref 0.5–4.6)
LYMPHOCYTES NFR BLD: 21 % (ref 13–44)
MCH RBC QN AUTO: 27.1 PG (ref 26.1–32.9)
MCHC RBC AUTO-ENTMCNC: 31.3 G/DL (ref 31.4–35)
MCV RBC AUTO: 86.6 FL (ref 82–102)
MONOCYTES # BLD: 1.4 K/UL (ref 0.1–1.3)
MONOCYTES NFR BLD: 12 % (ref 4–12)
NEUTS SEG # BLD: 7 K/UL (ref 1.7–8.2)
NEUTS SEG NFR BLD: 63 % (ref 43–78)
NRBC # BLD: 0.02 K/UL (ref 0–0.2)
PLATELET # BLD AUTO: 367 K/UL (ref 150–450)
PMV BLD AUTO: 10.1 FL (ref 9.4–12.3)
RBC # BLD AUTO: 2.91 M/UL (ref 4.05–5.2)
SARS-COV-2 RDRP RESP QL NAA+PROBE: NOT DETECTED
SOURCE: NORMAL
WBC # BLD AUTO: 11.1 K/UL (ref 4.3–11.1)

## 2024-05-10 PROCEDURE — 87635 SARS-COV-2 COVID-19 AMP PRB: CPT

## 2024-05-10 PROCEDURE — 85025 COMPLETE CBC W/AUTO DIFF WBC: CPT

## 2024-05-10 PROCEDURE — 6360000002 HC RX W HCPCS: Performed by: INTERNAL MEDICINE

## 2024-05-10 PROCEDURE — 36415 COLL VENOUS BLD VENIPUNCTURE: CPT

## 2024-05-10 PROCEDURE — 2580000003 HC RX 258: Performed by: STUDENT IN AN ORGANIZED HEALTH CARE EDUCATION/TRAINING PROGRAM

## 2024-05-10 PROCEDURE — 6370000000 HC RX 637 (ALT 250 FOR IP): Performed by: STUDENT IN AN ORGANIZED HEALTH CARE EDUCATION/TRAINING PROGRAM

## 2024-05-10 PROCEDURE — 6370000000 HC RX 637 (ALT 250 FOR IP): Performed by: INTERNAL MEDICINE

## 2024-05-10 RX ORDER — FERROUS SULFATE 325(65) MG
325 TABLET ORAL
Qty: 30 TABLET | Refills: 1 | Status: SHIPPED | OUTPATIENT
Start: 2024-05-11

## 2024-05-10 RX ORDER — HYDROCODONE BITARTRATE AND ACETAMINOPHEN 5; 325 MG/1; MG/1
1 TABLET ORAL EVERY 6 HOURS PRN
Qty: 12 TABLET | Refills: 0 | Status: SHIPPED | OUTPATIENT
Start: 2024-05-10 | End: 2024-05-13

## 2024-05-10 RX ORDER — DIMETHICONE, CAMPHOR (SYNTHETIC), MENTHOL, AND PHENOL 1.1; .5; .625; .5 G/100G; G/100G; G/100G; G/100G
OINTMENT TOPICAL PRN
Status: DISCONTINUED | OUTPATIENT
Start: 2024-05-10 | End: 2024-05-10 | Stop reason: HOSPADM

## 2024-05-10 RX ADMIN — LEVOTHYROXINE SODIUM 50 MCG: 0.05 TABLET ORAL at 05:26

## 2024-05-10 RX ADMIN — Medication: at 11:33

## 2024-05-10 RX ADMIN — ACETAMINOPHEN 650 MG: 325 TABLET ORAL at 09:09

## 2024-05-10 RX ADMIN — FERROUS SULFATE TAB 325 MG (65 MG ELEMENTAL FE) 325 MG: 325 (65 FE) TAB at 09:10

## 2024-05-10 RX ADMIN — SODIUM CHLORIDE, PRESERVATIVE FREE 10 ML: 5 INJECTION INTRAVENOUS at 09:10

## 2024-05-10 RX ADMIN — DAKIN'S SOLUTION 0.125% (QUARTER STRENGTH): 0.12 SOLUTION at 09:10

## 2024-05-10 RX ADMIN — ENOXAPARIN SODIUM 40 MG: 100 INJECTION SUBCUTANEOUS at 09:10

## 2024-05-10 RX ADMIN — METOPROLOL SUCCINATE 50 MG: 50 TABLET, EXTENDED RELEASE ORAL at 09:10

## 2024-05-10 ASSESSMENT — PAIN DESCRIPTION - DESCRIPTORS: DESCRIPTORS: ACHING

## 2024-05-10 ASSESSMENT — PAIN SCALES - GENERAL: PAINLEVEL_OUTOF10: 3

## 2024-05-10 ASSESSMENT — PAIN DESCRIPTION - LOCATION: LOCATION: BACK

## 2024-05-10 NOTE — WOUND CARE
Follow up for sacral wound improved a great deal, continue dakin 's moist Kerlix packing with pink bordered foam. Continue frequent turning/ offloading.       Left heel  Eschar prior to debridement.     Left heel post debridement, scatted slough, pink healing, recommend xeroform abd and tejinder daily.     Right heel 3x3cm stable eschar, recommend foam dressing daily.     Offload with heel boots.

## 2024-05-10 NOTE — CARE COORDINATION
Discharge note:  [facility] ambulance transport arranged for 3 pm to go to [facility] for private pay room and board (not STR), room 112B, unit 1, report [phone].   also notified [facility] home health liaison Ms. Evelyn Li of transfer, and also that patient may want to switch to hospice care after she is settled into the SNF.  This plan is ok with Ms. Chapin and with her niece Ms. Chaudharyh Tessie in room 603.       05/10/24 1409   Service Assessment   Cognition Alert   Accompanied By/Relationship leti Kurtz   Services At/After Discharge   Services At/After Discharge Skilled Nursing Facility (SNF)  (private pay SNF)   Condition of Participation: Discharge Planning   The Plan for Transition of Care is related to the following treatment goals: private pay at [facility]   The Patient and/or Patient Representative was provided with a Choice of Provider? Patient;Patient Representative   Name of the Patient Representative who was provided with the Choice of Provider and agrees with the Discharge Plan?  leti Kurtz   The Patient and/Or Patient Representative agree with the Discharge Plan? Yes   Freedom of Choice list was provided with basic dialogue that supports the patient's individualized plan of care/goals, treatment preferences, and shares the quality data associated with the providers?  Yes

## 2024-05-10 NOTE — PLAN OF CARE
Problem: Discharge Planning  Goal: Discharge to home or other facility with appropriate resources  5/10/2024 0351 by Heidy Fuentes RN  Outcome: Progressing  Flowsheets (Taken 5/9/2024 1914)  Discharge to home or other facility with appropriate resources:   Identify barriers to discharge with patient and caregiver   Arrange for needed discharge resources and transportation as appropriate   Arrange for interpreters to assist at discharge as needed  5/9/2024 1659 by Pati Gray RN  Outcome: Progressing     Problem: Pain  Goal: Verbalizes/displays adequate comfort level or baseline comfort level  5/10/2024 0351 by Heidy Fuentes RN  Outcome: Progressing  Flowsheets (Taken 5/9/2024 1914)  Verbalizes/displays adequate comfort level or baseline comfort level:   Encourage patient to monitor pain and request assistance   Assess pain using appropriate pain scale   Administer analgesics based on type and severity of pain and evaluate response  5/9/2024 1659 by Pati Gray RN  Outcome: Progressing     Problem: Skin/Tissue Integrity  Goal: Absence of new skin breakdown  Description: 1.  Monitor for areas of redness and/or skin breakdown  2.  Assess vascular access sites hourly  3.  Every 4-6 hours minimum:  Change oxygen saturation probe site  4.  Every 4-6 hours:  If on nasal continuous positive airway pressure, respiratory therapy assess nares and determine need for appliance change or resting period.  5/10/2024 0351 by Heidy Fuentes RN  Outcome: Progressing  5/9/2024 1659 by Pati Gray RN  Outcome: Progressing     Problem: Safety - Adult  Goal: Free from fall injury  5/10/2024 0351 by Heidy Fuentes RN  Outcome: Progressing  5/9/2024 1659 by Pati Gray RN  Outcome: Progressing     Problem: ABCDS Injury Assessment  Goal: Absence of physical injury  5/10/2024 0351 by Heidy Fuentes RN  Outcome: Progressing  5/9/2024 1659 by Pati Gray RN  Outcome: Progressing     Problem: Nutrition Deficit:  Goal:  Optimize nutritional status  5/10/2024 0351 by Heidy Fuentes, RN  Outcome: Progressing  5/9/2024 1659 by Pati Gray, RN  Outcome: Progressing

## 2024-05-10 NOTE — PROGRESS NOTES
TRANSFER - OUT REPORT:    Verbal report given to Vandana  marcelo Chapin  being transferred to [facility] room 112B unit 1 for routine progression of patient care       Report consisted of patient's Situation, Background, Assessment and   Recommendations(SBAR).     Information from the following report(s) Nurse Handoff Report was reviewed with the receiving nurse.           Lines:   Peripheral IV 05/07/24 Right Antecubital (Active)   Site Assessment Clean, dry & intact 05/10/24 1137   Line Status Capped 05/10/24 1137   Line Care Connections checked and tightened 05/10/24 1137   Phlebitis Assessment No symptoms 05/10/24 1137   Infiltration Assessment 0 05/10/24 1137   Alcohol Cap Used Yes 05/10/24 1137   Dressing Status Clean, dry & intact 05/10/24 1137   Dressing Type Transparent 05/10/24 1137        Opportunity for questions and clarification was provided.

## 2024-05-10 NOTE — DISCHARGE SUMMARY
05/03/2024 04:01 PM    WBCUA 0-3 05/03/2024 04:01 PM    RBCUA 0-3 05/03/2024 04:01 PM    BACTERIA 0 05/03/2024 04:01 PM    LABCAST HYALINE 05/03/2024 04:01 PM    MUCUS 0 05/03/2024 04:01 PM        Microbiology:  Results       Procedure Component Value Units Date/Time    COVID-19, Rapid [[id number]] Collected: 05/10/24 1136    Order Status: Sent Specimen: Nasopharyngeal Swab Updated: 05/10/24 1152    Culture, Blood 1 [[id number]] Collected: 05/03/24 0808    Order Status: Completed Specimen: Blood Updated: 05/08/24 1340     Special Requests --        RIGHT  HAND       Culture NO GROWTH 5 DAYS       Culture, Blood 1 [[id number]] Collected: 05/03/24 0808    Order Status: Completed Specimen: Blood Updated: 05/08/24 1340     Special Requests --        LEFT  HAND       Culture NO GROWTH 5 DAYS       Blood Culture 2 [[id number]]  (Abnormal) Collected: 05/01/24 1442    Order Status: Completed Specimen: Blood Updated: 05/04/24 0751     Special Requests --        RIGHT  Antecubital       Gram Stain       GRAM POS COCCI IN CLUSTERS            AEROBIC BOTTLE POSITIVE               RESULTS VERIFIED, PHONED TO AND READ BACK BY RIVERA ENGLAND RN @ 4821 ON 5.2.24              Gram negative rods         ANAEROBIC BOTTLE POSITIVE               CRITICAL RESULT NOT CALLED DUE TO PREVIOUS NOTIFICATION OF CRITICAL RESULT WITHIN THE LAST 24 HOURS.           Culture       STAPHYLOCOCCUS SPECIES, COAGULASE NEGATIVE This organism may be indicative of culture contamination. Clinical correlation needs to be evaluated as each case is unique.                  Refer to Blood Culture ID Panel Accession [id number]                  Gram negative rods For identification and susceptibility refer to culture [id number]          Culture, Blood, PCR ID Panel [[id number]]  (Abnormal) Collected: 05/01/24 1442    Order Status: Completed Specimen: Blood Updated: 05/02/24 2253     Accession Number [id number]     Enterococcus faecalis by PCR NOT DETECTED         367 150 - 450 K/uL    MPV 10.1 9.4 - 12.3 FL    nRBC 0.02 0.0 - 0.2 K/uL    Differential Type AUTOMATED      Neutrophils % 63 43 - 78 %    Lymphocytes % 21 13 - 44 %    Monocytes % 12 4.0 - 12.0 %    Eosinophils % 1 0.5 - 7.8 %    Basophils % 1 0.0 - 2.0 %    Immature Granulocytes % 2 0.0 - 5.0 %    Neutrophils Absolute 7.0 1.7 - 8.2 K/UL    Lymphocytes Absolute 2.4 0.5 - 4.6 K/UL    Monocytes Absolute 1.4 (H) 0.1 - 1.3 K/UL    Eosinophils Absolute 0.1 0.0 - 0.8 K/UL    Basophils Absolute 0.1 0.0 - 0.2 K/UL    Immature Granulocytes Absolute 0.2 0.0 - 0.5 K/UL       No results for input(s): \"COVID19\" in the last 72 hours.    Recent Vital Data:  Patient Vitals for the past 24 hrs:   Temp Pulse Resp BP SpO2   05/10/24 0809 97.6 °F (36.4 °C) 89 18 (!) 151/69 96 %   05/10/24 0244 98.1 °F (36.7 °C) 91 17 (!) 165/67 94 %   05/09/24 1909 97.7 °F (36.5 °C) 77 18 (!) 168/59 95 %   05/09/24 1457 98.6 °F (37 °C) 78 24 (!) 153/60 96 %       Oxygen Therapy  SpO2: 96 %  Pulse via Oximetry: 93 beats per minute  O2 Device: None (Room air)    Estimated body mass index is 26.48 kg/m² as calculated from the following:    Height as of this encounter: 1.676 m (5' 5.98\").    Weight as of this encounter: 74.4 kg (164 lb).    Intake/Output Summary (Last 24 hours) at 5/10/2024 1153  Last data filed at 5/10/2024 0938  Gross per 24 hour   Intake 2961.81 ml   Output 1000 ml   Net 1961.81 ml         Physical Exam:    General:    Chronically ill appearing  Head:  Normocephalic, atraumatic  Eyes:  Sclerae appear normal.  Pupils equally round.    HENT:  Nares appear normal, no drainage.  Moist mucous membranes  Neck:  No restricted ROM.  Trachea midline  CV:   RRR.  No m/r/g.  No JVD  Lungs:   CTAB.  No wheezing, rhonchi, or rales.  Respirations even, unlabored  Abdomen:   Soft, nontender, nondistended.    Extremities: Warm and dry.   No edema.    Skin:     No rashes.  Normal coloration  Neuro:  CN II-XII grossly intact. Paraplegic   Psych:  Normal

## 2024-05-10 NOTE — PROGRESS NOTES
Pt  bed in lowest position, wheels locked, and call light within reach. Hourly rounds completed. VSS. IV is patent and dressing is clean, dry, and intact. Pt turned throughout shift. Ensure given and pt finished one whole bottle.

## 2024-05-11 LAB — PTH RELATED PROT SERPL-SCNC: <2 PMOL/L

## 2024-05-12 LAB
ALBUMIN SERPL ELPH-MCNC: 2 G/DL (ref 2.9–4.4)
ALBUMIN/GLOB SERPL: 0.7 (ref 0.7–1.7)
ALPHA1 GLOB SERPL ELPH-MCNC: 0.5 G/DL (ref 0–0.4)
ALPHA2 GLOB SERPL ELPH-MCNC: 0.8 G/DL (ref 0.4–1)
B-GLOBULIN SERPL ELPH-MCNC: 0.7 G/DL (ref 0.7–1.3)
GAMMA GLOB SERPL ELPH-MCNC: 1.2 G/DL (ref 0.4–1.8)
GLOBULIN SER-MCNC: 3.2 G/DL (ref 2.2–3.9)
IGA SERPL-MCNC: 290 MG/DL (ref 64–422)
IGG SERPL-MCNC: 1261 MG/DL (ref 586–1602)
IGM SERPL-MCNC: 61 MG/DL (ref 26–217)
INTERPRETATION SERPL IEP-IMP: ABNORMAL
M PROTEIN SERPL ELPH-MCNC: ABNORMAL G/DL
PROT SERPL-MCNC: 5.2 G/DL (ref 6–8.5)

## 2024-05-17 DIAGNOSIS — C79.51 CARCINOMA OF BREAST METASTATIC TO BONE, UNSPECIFIED LATERALITY (HCC): Primary | ICD-10-CM

## 2024-05-17 DIAGNOSIS — C50.919 CARCINOMA OF BREAST METASTATIC TO BONE, UNSPECIFIED LATERALITY (HCC): Primary | ICD-10-CM

## 2024-05-21 ENCOUNTER — OFFICE VISIT (OUTPATIENT)
Dept: ONCOLOGY | Age: 89
End: 2024-05-21
Payer: MEDICARE

## 2024-05-21 ENCOUNTER — HOSPITAL ENCOUNTER (OUTPATIENT)
Dept: LAB | Age: 89
Discharge: HOME | End: 2024-05-24
Payer: MEDICARE

## 2024-05-21 VITALS
OXYGEN SATURATION: 99 % | TEMPERATURE: 97.8 F | DIASTOLIC BLOOD PRESSURE: 66 MMHG | WEIGHT: 164 LBS | HEIGHT: 66 IN | BODY MASS INDEX: 26.36 KG/M2 | RESPIRATION RATE: 22 BRPM | SYSTOLIC BLOOD PRESSURE: 159 MMHG | HEART RATE: 83 BPM

## 2024-05-21 DIAGNOSIS — C50.919 CARCINOMA OF BREAST METASTATIC TO BONE, UNSPECIFIED LATERALITY (HCC): Primary | ICD-10-CM

## 2024-05-21 DIAGNOSIS — N63.20 MASS OF LEFT BREAST, UNSPECIFIED QUADRANT: ICD-10-CM

## 2024-05-21 DIAGNOSIS — C50.919 CARCINOMA OF BREAST METASTATIC TO BONE, UNSPECIFIED LATERALITY (HCC): ICD-10-CM

## 2024-05-21 DIAGNOSIS — C79.51 CARCINOMA OF BREAST METASTATIC TO BONE, UNSPECIFIED LATERALITY (HCC): Primary | ICD-10-CM

## 2024-05-21 DIAGNOSIS — C79.51 CARCINOMA OF BREAST METASTATIC TO BONE, UNSPECIFIED LATERALITY (HCC): ICD-10-CM

## 2024-05-21 LAB
ALBUMIN SERPL-MCNC: 2.2 G/DL (ref 3.2–4.6)
ALBUMIN/GLOB SERPL: 0.5 (ref 1–1.9)
ALP SERPL-CCNC: 192 U/L (ref 35–104)
ALT SERPL-CCNC: 11 U/L (ref 12–65)
ANION GAP SERPL CALC-SCNC: 11 MMOL/L (ref 9–18)
AST SERPL-CCNC: 45 U/L (ref 15–37)
BASOPHILS # BLD: 0.1 K/UL (ref 0–0.2)
BASOPHILS NFR BLD: 1 % (ref 0–2)
BILIRUB SERPL-MCNC: 0.5 MG/DL (ref 0–1.2)
BUN SERPL-MCNC: 13 MG/DL (ref 8–23)
CALCIUM SERPL-MCNC: 7.4 MG/DL (ref 8.8–10.2)
CANCER AG15-3 SERPL-ACNC: 292 U/ML (ref 0–25)
CHLORIDE SERPL-SCNC: 107 MMOL/L (ref 98–107)
CO2 SERPL-SCNC: 21 MMOL/L (ref 20–28)
CREAT SERPL-MCNC: 0.75 MG/DL (ref 0.6–1.1)
DIFFERENTIAL METHOD BLD: ABNORMAL
EOSINOPHIL # BLD: 0.2 K/UL (ref 0–0.8)
EOSINOPHIL NFR BLD: 3 % (ref 0.5–7.8)
ERYTHROCYTE [DISTWIDTH] IN BLOOD BY AUTOMATED COUNT: 17.9 % (ref 11.9–14.6)
GLOBULIN SER CALC-MCNC: 4.2 G/DL (ref 2.3–3.5)
GLUCOSE SERPL-MCNC: 157 MG/DL (ref 70–99)
HCT VFR BLD AUTO: 33.6 % (ref 35.8–46.3)
HGB BLD-MCNC: 10.2 G/DL (ref 11.7–15.4)
IMM GRANULOCYTES # BLD AUTO: 0 K/UL (ref 0–0.5)
IMM GRANULOCYTES NFR BLD AUTO: 0 % (ref 0–5)
LYMPHOCYTES # BLD: 2.1 K/UL (ref 0.5–4.6)
LYMPHOCYTES NFR BLD: 28 % (ref 13–44)
MCH RBC QN AUTO: 27.3 PG (ref 26.1–32.9)
MCHC RBC AUTO-ENTMCNC: 30.4 G/DL (ref 31.4–35)
MCV RBC AUTO: 90.1 FL (ref 82–102)
MONOCYTES # BLD: 1 K/UL (ref 0.1–1.3)
MONOCYTES NFR BLD: 13 % (ref 4–12)
NEUTS SEG # BLD: 4.2 K/UL (ref 1.7–8.2)
NEUTS SEG NFR BLD: 55 % (ref 43–78)
NRBC # BLD: 0 K/UL (ref 0–0.2)
PLATELET # BLD AUTO: 371 K/UL (ref 150–450)
PMV BLD AUTO: 9.4 FL (ref 9.4–12.3)
POTASSIUM SERPL-SCNC: 4.2 MMOL/L (ref 3.5–5.1)
PROT SERPL-MCNC: 6.4 G/DL (ref 6.3–8.2)
RBC # BLD AUTO: 3.73 M/UL (ref 4.05–5.2)
SODIUM SERPL-SCNC: 139 MMOL/L (ref 136–145)
WBC # BLD AUTO: 7.6 K/UL (ref 4.3–11.1)

## 2024-05-21 PROCEDURE — 99215 OFFICE O/P EST HI 40 MIN: CPT | Performed by: INTERNAL MEDICINE

## 2024-05-21 PROCEDURE — 80053 COMPREHEN METABOLIC PANEL: CPT

## 2024-05-21 PROCEDURE — 86300 IMMUNOASSAY TUMOR CA 15-3: CPT

## 2024-05-21 PROCEDURE — 85025 COMPLETE CBC W/AUTO DIFF WBC: CPT

## 2024-05-21 PROCEDURE — 1123F ACP DISCUSS/DSCN MKR DOCD: CPT | Performed by: INTERNAL MEDICINE

## 2024-05-21 PROCEDURE — 36415 COLL VENOUS BLD VENIPUNCTURE: CPT

## 2024-05-21 ASSESSMENT — PATIENT HEALTH QUESTIONNAIRE - PHQ9
SUM OF ALL RESPONSES TO PHQ QUESTIONS 1-9: 0
1. LITTLE INTEREST OR PLEASURE IN DOING THINGS: NOT AT ALL
SUM OF ALL RESPONSES TO PHQ QUESTIONS 1-9: 0
SUM OF ALL RESPONSES TO PHQ9 QUESTIONS 1 & 2: 0
SUM OF ALL RESPONSES TO PHQ QUESTIONS 1-9: 0
2. FEELING DOWN, DEPRESSED OR HOPELESS: NOT AT ALL
SUM OF ALL RESPONSES TO PHQ QUESTIONS 1-9: 0

## 2024-05-21 NOTE — PATIENT INSTRUCTIONS
05/21/2024 0.0  0.0 - 0.5 K/UL Final    Differential Type 05/21/2024 AUTOMATED    Final                   Please refer to After Visit Summary or [facility] for upcoming appointment information. If you have any questions regarding your upcoming schedule please reach out to your care team through [employer] or call [phone].    Please notify your assigned Nurse Navigator of any unplanned hospital admissions or Emergency Room visits within 24 hours of discharge.    -------------------------------------------------------------------------------------------------------------------  Please call our office at [phone] if you have any  of the following symptoms:   Fever of 100.5 or greater  Chills  Shortness of breath  Swelling or pain in one leg    After office hours an answering service is available and will contact a provider for emergencies or if you are experiencing any of the above symptoms.        YARA MONTES DE OCA RN

## 2024-05-21 NOTE — PROGRESS NOTES
Gram-negative bacteremia    Leukocytosis    DVT (deep venous thrombosis) (HCC)           PLAN:  Lab studies and imaging studies were personally reviewed.    Pertinent old records were reviewed.     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  She presented with progressive inability to walk, spine imaging showed multiple vertebral lesions.  She was reportedly seen at [facility] but no matching patients found in Care Everywhere.  Her family has relocated her to [place], she was residing in an assisted living facility but was hospitalized for multiple active issues including bacteremia, hypercalcemia, anemia, mobility issues, some of which is due to cancer.  We recommended acute stabilization and then consider endocrine therapy, anything stronger than this would be contraindicated.     Here for follow-up.  She is at [facility] and is actually doing relatively well, her symptoms are unchanged and she remains wheelchair bound due to functional paraplegia, but paradoxically this has improved her back and leg pain.  She has mild left shoulder pain but nothing requiring opioids at present.  Her breast mass persists and does also cause mild pain.  We discussed options for management.  At this point I do believe she could tolerate aromatase inhibitor therapy, but it would be nice to get additional staging (PET/CT) and a biopsy locally so we can repeat IHC and potentially broader tests such as NGS for therapeutic targets.  However, they are unsure if they wish to proceed with additional cancer treatment or diagnostics, since she is doing better physically.  We will order these but they will let us know if they decide to proceed with testing, or if they are interested in starting Arimidex.  All questions were asked and answered to the best of my ability.  In all, I spent 40 minutes in the care of Ms. Tavares

## 2024-05-22 ENCOUNTER — CLINICAL DOCUMENTATION (OUTPATIENT)
Dept: ONCOLOGY | Age: 89
End: 2024-05-22

## 2024-05-22 ENCOUNTER — TELEPHONE (OUTPATIENT)
Dept: MAMMOGRAPHY | Age: 89
End: 2024-05-22

## 2024-05-22 NOTE — PROGRESS NOTES
Coordination with Dr. Navarrete's team and [facility] Doyle breast navigators to arrange breast imaging and biopsy.  Imaging /potential biopsy will be offered for 6-18-24 at 0830.   will arrange.  Next outreach 6-5-24 to see if patient accepted and scheduled appointment for 6-18-24.

## 2024-05-23 ENCOUNTER — CLINICAL DOCUMENTATION (OUTPATIENT)
Dept: ONCOLOGY | Age: 89
End: 2024-05-23

## 2024-05-23 NOTE — PROGRESS NOTES
Chart review.  Noted patient has declined breast imaging /biopsy. Notified Dr. Navarrete's team.  Navigation will remain available if needed.

## 2024-06-17 ENCOUNTER — TELEPHONE (OUTPATIENT)
Dept: ONCOLOGY | Age: 89
End: 2024-06-17

## 2024-06-17 NOTE — TELEPHONE ENCOUNTER
RN called patient and discussed patients wishes for treatment. Patient would like to keep office visit on 7/5/24 to discuss starting the Arimidex. She is not interested in targeted treatment at this time. All questions answered by RN, no furthe questions at this time.

## 2024-06-17 NOTE — TELEPHONE ENCOUNTER
RN called patient's niece, Lyric, to discuss patient's wishes for further treatment. Lryic states she is going to visit patient now and requests callback this afternoon to discuss patient's wishes. RN reviewed that we could discuss starting the arimidex medication at her visit in July if patient was interested but if we wanted to pursue a more targeted therapy we would need the PET scan and additional imaging/biopsy. Lyric will review with patient and RN to call back this afternoon.